# Patient Record
Sex: MALE | Race: WHITE | NOT HISPANIC OR LATINO
[De-identification: names, ages, dates, MRNs, and addresses within clinical notes are randomized per-mention and may not be internally consistent; named-entity substitution may affect disease eponyms.]

---

## 2017-11-21 ENCOUNTER — RESULT REVIEW (OUTPATIENT)
Age: 62
End: 2017-11-21

## 2018-04-17 ENCOUNTER — OUTPATIENT (OUTPATIENT)
Dept: OUTPATIENT SERVICES | Facility: HOSPITAL | Age: 63
LOS: 1 days | Discharge: HOME | End: 2018-04-17

## 2018-04-17 DIAGNOSIS — Z95.5 PRESENCE OF CORONARY ANGIOPLASTY IMPLANT AND GRAFT: Chronic | ICD-10-CM

## 2018-04-17 DIAGNOSIS — C91.40 HAIRY CELL LEUKEMIA NOT HAVING ACHIEVED REMISSION: ICD-10-CM

## 2018-04-23 ENCOUNTER — RESULT REVIEW (OUTPATIENT)
Age: 63
End: 2018-04-23

## 2018-04-23 ENCOUNTER — APPOINTMENT (OUTPATIENT)
Dept: HEMATOLOGY ONCOLOGY | Facility: CLINIC | Age: 63
End: 2018-04-23
Payer: COMMERCIAL

## 2018-04-23 ENCOUNTER — APPOINTMENT (OUTPATIENT)
Dept: HEMATOLOGY ONCOLOGY | Facility: CLINIC | Age: 63
End: 2018-04-23

## 2018-04-23 LAB
ALBUMIN SERPL ELPH-MCNC: 4.3 G/DL
ALP BLD-CCNC: 96 U/L
ALT SERPL-CCNC: 65 U/L
ANION GAP SERPL CALC-SCNC: 13 MMOL/L
AST SERPL-CCNC: 27 U/L
BASOPHILS # BLD AUTO: 0 K/UL — SIGNIFICANT CHANGE UP (ref 0–0.2)
BASOPHILS NFR BLD AUTO: 0.4 % — SIGNIFICANT CHANGE UP (ref 0–2)
BILIRUB SERPL-MCNC: 1.6 MG/DL
BUN SERPL-MCNC: 19 MG/DL
CALCIUM SERPL-MCNC: 9.4 MG/DL
CHLORIDE SERPL-SCNC: 104 MMOL/L
CO2 SERPL-SCNC: 26 MMOL/L
CREAT SERPL-MCNC: 0.95 MG/DL
EOSINOPHIL # BLD AUTO: 0.1 K/UL — SIGNIFICANT CHANGE UP (ref 0–0.5)
EOSINOPHIL NFR BLD AUTO: 2.6 % — SIGNIFICANT CHANGE UP (ref 0–6)
GLUCOSE SERPL-MCNC: 110 MG/DL
HCT VFR BLD CALC: 48.4 % — SIGNIFICANT CHANGE UP (ref 39–50)
HGB BLD-MCNC: 17.3 G/DL — HIGH (ref 13–17)
LDH SERPL-CCNC: 159 U/L
LYMPHOCYTES # BLD AUTO: 1.8 K/UL — SIGNIFICANT CHANGE UP (ref 1–3.3)
LYMPHOCYTES # BLD AUTO: 41.8 % — SIGNIFICANT CHANGE UP (ref 13–44)
MCHC RBC-ENTMCNC: 32 PG — SIGNIFICANT CHANGE UP (ref 27–34)
MCHC RBC-ENTMCNC: 35.6 G/DL — SIGNIFICANT CHANGE UP (ref 32–36)
MCV RBC AUTO: 89.8 FL — SIGNIFICANT CHANGE UP (ref 80–100)
MONOCYTES # BLD AUTO: 0.2 K/UL — SIGNIFICANT CHANGE UP (ref 0–0.9)
MONOCYTES NFR BLD AUTO: 5.2 % — SIGNIFICANT CHANGE UP (ref 2–14)
NEUTROPHILS # BLD AUTO: 2.2 K/UL — SIGNIFICANT CHANGE UP (ref 1.8–7.4)
NEUTROPHILS NFR BLD AUTO: 50.1 % — SIGNIFICANT CHANGE UP (ref 43–77)
PLATELET # BLD AUTO: 163 K/UL — SIGNIFICANT CHANGE UP (ref 150–400)
POTASSIUM SERPL-SCNC: 4.5 MMOL/L
PROT SERPL-MCNC: 6.8 G/DL
PSA SERPL-MCNC: 1.13 NG/ML
RBC # BLD: 5.39 M/UL — SIGNIFICANT CHANGE UP (ref 4.2–5.8)
RBC # FLD: 12.1 % — SIGNIFICANT CHANGE UP (ref 10.3–14.5)
SODIUM SERPL-SCNC: 143 MMOL/L
WBC # BLD: 4.4 K/UL — SIGNIFICANT CHANGE UP (ref 3.8–10.5)
WBC # FLD AUTO: 4.4 K/UL — SIGNIFICANT CHANGE UP (ref 3.8–10.5)

## 2018-04-23 PROCEDURE — 99215 OFFICE O/P EST HI 40 MIN: CPT

## 2018-05-03 ENCOUNTER — EMERGENCY (EMERGENCY)
Facility: HOSPITAL | Age: 63
LOS: 1 days | Discharge: HOME | End: 2018-05-03
Attending: EMERGENCY MEDICINE | Admitting: EMERGENCY MEDICINE
Payer: COMMERCIAL

## 2018-05-03 VITALS
RESPIRATION RATE: 18 BRPM | DIASTOLIC BLOOD PRESSURE: 84 MMHG | OXYGEN SATURATION: 96 % | SYSTOLIC BLOOD PRESSURE: 143 MMHG | HEART RATE: 93 BPM | TEMPERATURE: 98 F

## 2018-05-03 DIAGNOSIS — Z95.5 PRESENCE OF CORONARY ANGIOPLASTY IMPLANT AND GRAFT: Chronic | ICD-10-CM

## 2018-05-03 PROCEDURE — 99283 EMERGENCY DEPT VISIT LOW MDM: CPT

## 2018-05-03 RX ORDER — METOPROLOL TARTRATE 50 MG
50 TABLET ORAL ONCE
Qty: 0 | Refills: 0 | Status: COMPLETED | OUTPATIENT
Start: 2018-05-03 | End: 2018-05-03

## 2018-05-03 RX ORDER — FAMOTIDINE 10 MG/ML
40 INJECTION INTRAVENOUS ONCE
Qty: 0 | Refills: 0 | Status: COMPLETED | OUTPATIENT
Start: 2018-05-03 | End: 2018-05-03

## 2018-05-03 RX ORDER — EPINEPHRINE 0.3 MG/.3ML
0.3 INJECTION INTRAMUSCULAR; SUBCUTANEOUS ONCE
Qty: 0 | Refills: 0 | Status: COMPLETED | OUTPATIENT
Start: 2018-05-03 | End: 2018-05-03

## 2018-05-03 RX ORDER — ACETAMINOPHEN 500 MG
975 TABLET ORAL ONCE
Qty: 0 | Refills: 0 | Status: COMPLETED | OUTPATIENT
Start: 2018-05-03 | End: 2018-05-03

## 2018-05-03 RX ORDER — ONDANSETRON 8 MG/1
4 TABLET, FILM COATED ORAL ONCE
Qty: 0 | Refills: 0 | Status: COMPLETED | OUTPATIENT
Start: 2018-05-03 | End: 2018-05-03

## 2018-05-03 NOTE — ED ADULT TRIAGE NOTE - CHIEF COMPLAINT QUOTE
Pt ate sesame crusted tuna, had abdominal pain with nausea within an hour after, then noticed rashes develop on face, chest, back, abdomen, upper and lower extremities, took Benadryl at approx. 8PM with no relief, denies throat tightening, SOB, tongue, swelling, itching. Speaking in complete sentences, no drooling.

## 2018-05-03 NOTE — ED PROVIDER NOTE - PMH
CAD in native artery    Gilbert's syndrome    Hairy cell leukemia  S/P Chemo  History of appendectomy    History of supraventricular tachycardia    HTN (hypertension)    Ulnar fracture  S/P ORIF

## 2018-05-03 NOTE — ED PROVIDER NOTE - ATTENDING CONTRIBUTION TO CARE
I, Jennifer Cabot, MD, have performed a history and physical exam of the patient and discussed their management with the resident. I reviewed the resident's note and agree with the documented findings and plan of care. My medical decision making and observations are found above.    Cabot: 62M coming in with skin erythema, hives, diffuse abd pain, and diarrhea after eating tuna.  No dyspnea, stridor, mouth or throat swelling.  Sx nearly resolved after benadryl at 8pm.  On exam, oropharynx is normal, lungs CTAB, mild erythema to abdomen only, no hives, abd NTTP.  Scombroid vs allergic reaction.  Will monitor until 2am and if no rebound, will discharge home with short course of steroids, benadryl, H2B, and a rescue epipen.

## 2018-05-03 NOTE — ED PROVIDER NOTE - MEDICAL DECISION MAKING DETAILS
blanching diffuse skin erythema, nausea, abd pain, diarrhea after tuna ingestion->histamine/scombroid poisoning vs. anaphylaxis vs. food poisoning->steroids, antihistamines, zofran, tylenol, observe until 2am. blanching diffuse skin erythema, nausea, abd pain, diarrhea after tuna ingestion->histamine/scombroid poisoning vs. anaphylaxis vs. food poisoning->steroids, antihistamines, zofran, tylenol, observe until 2am.    Cabot: 62M coming in with skin erythema, hives, diffuse abd pain, and diarrhea after eating tuna.  No dyspnea, stridor, mouth or throat swelling.  Sx nearly resolved after benadryl at 8pm.  On exam, oropharynx is normal, lungs CTAB, mild erythema to abdomen only, no hives, abd NTTP.  Scombroid vs allergic reaction.  Will monitor until 2am and if no rebound, will discharge home with short course of steroids, benadryl, H2B, and a rescue epipen.

## 2018-05-03 NOTE — ED PROVIDER NOTE - PROGRESS NOTE DETAILS
AJM: pt feeling improved. meds already sent to pharmacy by dr cabot. stable for [facility] KIEL BEARD: patient signed out to me by resident Praveen to monitor patient until 2am and discharge with Epi pen to go home with. Pt symptom improved, no complaints of sob, or any other complaints. Pt can dispo home to f/u with PCP.

## 2018-05-03 NOTE — ED PROVIDER NOTE - OBJECTIVE STATEMENT
62yM hx htn, hld, CAD s/p stent (on asa and plavix) p/w diffuse body erythema, nausea, diarrhea x 5 episodes, abdominal pain. Pt ate a pepper/sesame seed crusted grilled tuna for dinner at 630pm. He was the only person who ate this tuna. Symptoms began at 730pm. Pt took 50mg po benadryl at 830pm and noticed improvement in erythema at ~10pm. Denies difficulty breathing or swallowing, facial swelling, fever, sob, cp, or other symptoms.

## 2018-05-03 NOTE — ED PROVIDER NOTE - SKIN RASH DESCRIPTION
back, chest, face, neck diffuse, patchy erythema to bl upper and lower extremities/BLANCHING/REDDENED

## 2018-05-04 VITALS
SYSTOLIC BLOOD PRESSURE: 117 MMHG | HEART RATE: 85 BPM | DIASTOLIC BLOOD PRESSURE: 74 MMHG | OXYGEN SATURATION: 99 % | RESPIRATION RATE: 16 BRPM

## 2018-05-04 RX ORDER — FAMOTIDINE 10 MG/ML
1 INJECTION INTRAVENOUS
Qty: 4 | Refills: 0 | OUTPATIENT
Start: 2018-05-04 | End: 2018-05-05

## 2018-05-04 RX ORDER — DIPHENHYDRAMINE HCL 50 MG
1 CAPSULE ORAL
Qty: 8 | Refills: 0 | OUTPATIENT
Start: 2018-05-04 | End: 2018-05-05

## 2018-05-04 RX ORDER — ONDANSETRON 8 MG/1
1 TABLET, FILM COATED ORAL
Qty: 6 | Refills: 0 | OUTPATIENT
Start: 2018-05-04 | End: 2018-05-05

## 2018-05-04 RX ORDER — EPINEPHRINE 0.3 MG/.3ML
0.3 INJECTION INTRAMUSCULAR; SUBCUTANEOUS
Qty: 1 | Refills: 0 | OUTPATIENT
Start: 2018-05-04

## 2018-05-04 RX ADMIN — EPINEPHRINE 0.3 MILLIGRAM(S): 0.3 INJECTION INTRAMUSCULAR; SUBCUTANEOUS at 00:16

## 2018-05-04 RX ADMIN — Medication 975 MILLIGRAM(S): at 00:12

## 2018-05-04 RX ADMIN — Medication 60 MILLIGRAM(S): at 00:11

## 2018-05-04 RX ADMIN — FAMOTIDINE 40 MILLIGRAM(S): 10 INJECTION INTRAVENOUS at 00:11

## 2018-05-04 RX ADMIN — ONDANSETRON 4 MILLIGRAM(S): 8 TABLET, FILM COATED ORAL at 00:11

## 2018-05-04 RX ADMIN — Medication 50 MILLIGRAM(S): at 00:12

## 2018-05-04 NOTE — ED ADULT NURSE NOTE - OBJECTIVE STATEMENT
62y M AaOx3 c/o Pt ate sesame crusted tuna, had abdominal pain with nausea within an hour after, then noticed rashes develop on face, chest, back, abdomen, upper and lower extremities, took Benadryl at approx. 8PM with no relief. pt denies sob/cp/palpitations at this time. pt presents in NAD and noted with no tongue or lip swelling. pt medicated as per md order.

## 2018-05-04 NOTE — ED ADULT NURSE REASSESSMENT NOTE - NS ED NURSE REASSESS COMMENT FT1
pt resting comfortable and drinking water. pt states he is having no difficulty breathing, respirations are even and unlabored. pt shows no signs of additional swelling/redness.

## 2018-07-02 ENCOUNTER — OUTPATIENT (OUTPATIENT)
Dept: EMERGENCY DEPT | Facility: HOSPITAL | Age: 63
LOS: 1 days | Discharge: HOME | End: 2018-07-02
Payer: COMMERCIAL

## 2018-07-02 ENCOUNTER — TRANSCRIPTION ENCOUNTER (OUTPATIENT)
Age: 63
End: 2018-07-02

## 2018-07-02 VITALS
TEMPERATURE: 98 F | OXYGEN SATURATION: 98 % | DIASTOLIC BLOOD PRESSURE: 82 MMHG | SYSTOLIC BLOOD PRESSURE: 153 MMHG | HEART RATE: 67 BPM | RESPIRATION RATE: 18 BRPM

## 2018-07-02 VITALS
HEART RATE: 66 BPM | OXYGEN SATURATION: 97 % | DIASTOLIC BLOOD PRESSURE: 68 MMHG | RESPIRATION RATE: 18 BRPM | SYSTOLIC BLOOD PRESSURE: 143 MMHG

## 2018-07-02 DIAGNOSIS — R07.9 CHEST PAIN, UNSPECIFIED: ICD-10-CM

## 2018-07-02 DIAGNOSIS — Z95.5 PRESENCE OF CORONARY ANGIOPLASTY IMPLANT AND GRAFT: Chronic | ICD-10-CM

## 2018-07-02 LAB
ALBUMIN SERPL ELPH-MCNC: 4.3 G/DL — SIGNIFICANT CHANGE UP (ref 3.3–5)
ALP SERPL-CCNC: 104 U/L — SIGNIFICANT CHANGE UP (ref 40–120)
ALT FLD-CCNC: 60 U/L — HIGH (ref 4–41)
APTT BLD: 29.5 SEC — SIGNIFICANT CHANGE UP (ref 27.5–37.4)
AST SERPL-CCNC: 24 U/L — SIGNIFICANT CHANGE UP (ref 4–40)
BILIRUB SERPL-MCNC: 1.9 MG/DL — HIGH (ref 0.2–1.2)
BUN SERPL-MCNC: 19 MG/DL — SIGNIFICANT CHANGE UP (ref 7–23)
CALCIUM SERPL-MCNC: 8.9 MG/DL — SIGNIFICANT CHANGE UP (ref 8.4–10.5)
CHLORIDE SERPL-SCNC: 103 MMOL/L — SIGNIFICANT CHANGE UP (ref 98–107)
CO2 SERPL-SCNC: 27 MMOL/L — SIGNIFICANT CHANGE UP (ref 22–31)
CREAT SERPL-MCNC: 0.84 MG/DL — SIGNIFICANT CHANGE UP (ref 0.5–1.3)
GLUCOSE SERPL-MCNC: 115 MG/DL — HIGH (ref 70–99)
HCT VFR BLD CALC: 45.3 % — SIGNIFICANT CHANGE UP (ref 39–50)
HGB BLD-MCNC: 16.5 G/DL — SIGNIFICANT CHANGE UP (ref 13–17)
INR BLD: 0.91 — SIGNIFICANT CHANGE UP (ref 0.88–1.17)
MCHC RBC-ENTMCNC: 31.1 PG — SIGNIFICANT CHANGE UP (ref 27–34)
MCHC RBC-ENTMCNC: 36.4 % — HIGH (ref 32–36)
MCV RBC AUTO: 85.5 FL — SIGNIFICANT CHANGE UP (ref 80–100)
NRBC # FLD: 0 — SIGNIFICANT CHANGE UP
PLATELET # BLD AUTO: 172 K/UL — SIGNIFICANT CHANGE UP (ref 150–400)
PMV BLD: 9 FL — SIGNIFICANT CHANGE UP (ref 7–13)
POTASSIUM SERPL-MCNC: 4.2 MMOL/L — SIGNIFICANT CHANGE UP (ref 3.5–5.3)
POTASSIUM SERPL-SCNC: 4.2 MMOL/L — SIGNIFICANT CHANGE UP (ref 3.5–5.3)
PROT SERPL-MCNC: 6.9 G/DL — SIGNIFICANT CHANGE UP (ref 6–8.3)
PROTHROM AB SERPL-ACNC: 10.4 SEC — SIGNIFICANT CHANGE UP (ref 9.8–13.1)
RBC # BLD: 5.3 M/UL — SIGNIFICANT CHANGE UP (ref 4.2–5.8)
RBC # FLD: 12.5 % — SIGNIFICANT CHANGE UP (ref 10.3–14.5)
SODIUM SERPL-SCNC: 141 MMOL/L — SIGNIFICANT CHANGE UP (ref 135–145)
TROPONIN T, HIGH SENSITIVITY: 6 NG/L — SIGNIFICANT CHANGE UP (ref ?–14)
WBC # BLD: 4.65 K/UL — SIGNIFICANT CHANGE UP (ref 3.8–10.5)
WBC # FLD AUTO: 4.65 K/UL — SIGNIFICANT CHANGE UP (ref 3.8–10.5)

## 2018-07-02 PROCEDURE — 93010 ELECTROCARDIOGRAM REPORT: CPT

## 2018-07-02 RX ORDER — DIPHENHYDRAMINE HYDROCHLORIDE AND LIDOCAINE HYDROCHLORIDE AND ALUMINUM HYDROXIDE AND MAGNESIUM HYDRO
15 KIT ONCE
Qty: 0 | Refills: 0 | Status: DISCONTINUED | OUTPATIENT
Start: 2018-07-02 | End: 2018-07-02

## 2018-07-02 RX ORDER — SODIUM CHLORIDE 9 MG/ML
500 INJECTION INTRAMUSCULAR; INTRAVENOUS; SUBCUTANEOUS
Qty: 0 | Refills: 0 | Status: DISCONTINUED | OUTPATIENT
Start: 2018-07-02 | End: 2018-07-02

## 2018-07-02 RX ORDER — ASPIRIN/CALCIUM CARB/MAGNESIUM 324 MG
243 TABLET ORAL ONCE
Qty: 0 | Refills: 0 | Status: COMPLETED | OUTPATIENT
Start: 2018-07-02 | End: 2018-07-02

## 2018-07-02 RX ORDER — HEPARIN SODIUM 5000 [USP'U]/ML
5000 INJECTION INTRAVENOUS; SUBCUTANEOUS EVERY 12 HOURS
Qty: 0 | Refills: 0 | Status: DISCONTINUED | OUTPATIENT
Start: 2018-07-03 | End: 2018-07-02

## 2018-07-02 RX ORDER — NITROGLYCERIN 6.5 MG
0.4 CAPSULE, EXTENDED RELEASE ORAL ONCE
Qty: 0 | Refills: 0 | Status: COMPLETED | OUTPATIENT
Start: 2018-07-02 | End: 2018-07-02

## 2018-07-02 RX ORDER — SODIUM CHLORIDE 9 MG/ML
3 INJECTION INTRAMUSCULAR; INTRAVENOUS; SUBCUTANEOUS EVERY 8 HOURS
Qty: 0 | Refills: 0 | Status: DISCONTINUED | OUTPATIENT
Start: 2018-07-02 | End: 2018-07-02

## 2018-07-02 RX ORDER — ACETAMINOPHEN 500 MG
650 TABLET ORAL EVERY 6 HOURS
Qty: 0 | Refills: 0 | Status: DISCONTINUED | OUTPATIENT
Start: 2018-07-02 | End: 2018-07-02

## 2018-07-02 RX ORDER — CLOPIDOGREL BISULFATE 75 MG/1
75 TABLET, FILM COATED ORAL DAILY
Qty: 0 | Refills: 0 | Status: DISCONTINUED | OUTPATIENT
Start: 2018-07-02 | End: 2018-07-02

## 2018-07-02 RX ADMIN — CLOPIDOGREL BISULFATE 75 MILLIGRAM(S): 75 TABLET, FILM COATED ORAL at 12:01

## 2018-07-02 RX ADMIN — Medication 0.4 MILLIGRAM(S): at 10:40

## 2018-07-02 RX ADMIN — Medication 243 MILLIGRAM(S): at 12:02

## 2018-07-02 NOTE — DISCHARGE NOTE ADULT - CARE PLAN
Principal Discharge DX:	Chest pain, unspecified type  Goal:	Resolution of symptoms  Assessment and plan of treatment:	Follow up with your Cardiologist or Primary care doctor in 1-2 weeks  Call doctor if symptoms return and if unreachable return to Emergency Department  Secondary Diagnosis:	CAD in native artery  Assessment and plan of treatment:	Continue home medications

## 2018-07-02 NOTE — ED ADULT NURSE REASSESSMENT NOTE - NS ED NURSE REASSESS COMMENT FT1
Patient awake and alert, states feeling chest pressure and light headed this morning while climbing stairs , felt like he was going to pass out. NSR on cardiac monitor, IV placed, labs sent. Will continue to monitor. Patient awake and alert, states feeling chest pressure and light headed this morning while climbing stairs , felt like he was going to pass out. NSR on cardiac monitor, IV placed, labs sent. Will continue to monitor. Patient states he still feels chest pressure, pain 6/10.

## 2018-07-02 NOTE — DISCHARGE NOTE ADULT - PLAN OF CARE
Resolution of symptoms Follow up with your Cardiologist or Primary care doctor in 1-2 weeks  Call doctor if symptoms return and if unreachable return to Emergency Department Continue home medications

## 2018-07-02 NOTE — ED PROVIDER NOTE - ATTENDING CONTRIBUTION TO CARE
DR. BLOCH, ATTENDING MD-  I performed a face to face bedside interview with patient regarding history of present illness, review of symptoms and past medical history. I completed an independent physical exam.  I have discussed patient's plan of care with the resident.   exertional 6/10 pressure pain with SOB, no n/diaphoresis, similar to cp with stent, stopped plavix yesterday- had 1 episode 2 days ago. Well appearing, Anxious, HEENT nl lungs clear, heart sounds nml ext no edema, pulses present, no neurodeficits. DR. BLOCH, ATTENDING MD-  I performed a face to face bedside interview with patient regarding history of present illness, review of symptoms and past medical history. I completed an independent physical exam.  I have discussed patient's plan of care with the resident.   exertional 6/10 pressure pain with SOB, no n/diaphoresis, similar to cp with stent, stopped plavix yesterday- had 1 episode 2 days ago. Well appearing, Anxious, HEENT nl lungs clear, heart sounds nml ext no edema, pulses present, no neuro deficits.

## 2018-07-02 NOTE — H&P CARDIOLOGY - HISTORY OF PRESENT ILLNESS
62 y/o M w/ PMH of CAD s/p stent(2012), Hairy cell leukemia(in remission), HTN and HLD presents for cardiac catheretization. Pt admits to having some chest pain over the weekend which resolved but today when he was walking up the stairs around 0830 AM he started having mid-sternal chest pressure, shortness of breath and lightheadedness. Pt had a angiogram in 2015 which showed his stent was patent but 2/2 his worrisome symptoms today he will have repeat angiogram to r/o worsening CAD. Pt denies N/V/D, fevers, chills, cough, palpitations, syncope, orthopnea, nocturnal paroxysmal dyspnea, edema, cyanosis, heart murmurs, varicosities, phlebitis, claudication.

## 2018-07-02 NOTE — H&P CARDIOLOGY - NEGATIVE NEUROLOGICAL SYMPTOMS
no vertigo/no loss of sensation/no difficulty walking/no confusion/no transient paralysis/no headache/no weakness/no paresthesias/no generalized seizures/no tremors/no loss of consciousness/no hemiparesis/no focal seizures/no syncope/no facial palsy

## 2018-07-02 NOTE — ED ADULT TRIAGE NOTE - CHIEF COMPLAINT QUOTE
p/t with hx cardiac stents c/o of chest discomfort with mild sob since this am, p/t appears uncomfortable

## 2018-07-02 NOTE — ED ADULT NURSE REASSESSMENT NOTE - NS ED NURSE REASSESS COMMENT FT1
Patient awake and alert, report given to cath lab RN, medicated as ordered with asa and Plavix. Patient continues to have chest pressure.

## 2018-07-02 NOTE — DISCHARGE NOTE ADULT - CARE PROVIDER_API CALL
Nilesh Montana), Cardiovascular Disease; Internal Medicine; Interventional Cardiology  [address] [address]  Phone: [phone]  Fax: [phone]

## 2018-07-02 NOTE — H&P CARDIOLOGY - ATTENDING COMMENTS
patient seen in the ER  Patient has a history of prior stent to the mid LADin 2013, subsequent catheterization 2015 patent stents  Now presents  with several episodes of exertional chest pressure and shortness of breath similar to what he had prior to the stent  EKG was normal  Physical exam unremarkable    He was brought to the cardiac Cath Lab  Catheterization was performed from the right femoral artery and Angio-Seal was inserted after the procedure  Catheterization revealed normal left ventricular function, no wall motion abnormality, widely patent stent in the LAD and no other significant disease in the left main circumflex or right coronary artery    Patient was reassured that his  coronaries were stable and no intervention was needed  He was discharged home from the catheter lab area after observation with stable right groin  He will follow-up with me routinely  within one month.    Nilesh Montana M.D.

## 2018-07-02 NOTE — H&P CARDIOLOGY - RS GEN PE MLT RESP DETAILS PC
breath sounds equal/good air movement/airway patent/no chest wall tenderness/clear to auscultation bilaterally/respirations non-labored

## 2018-07-02 NOTE — DISCHARGE NOTE ADULT - HOSPITAL COURSE
64 y/o M w/ PMH of CAD s/p stent(2012), Hairy cell leukemia(in remission), HTN and HLD presents for cardiac catheretization. Pt admits to having some chest pain over the weekend which resolved but today when he was walking up the stairs around 0830 AM he started having mid-sternal chest pressure, shortness of breath and lightheadedness. Pt had a angiogram in 2015 which showed his stent was patent but 2/2 his worrisome symptoms today he will have repeat angiogram to r/o worsening CAD. Pt denies N/V/D, fevers, chills, cough, palpitations, syncope, orthopnea, nocturnal paroxysmal dyspnea, edema, cyanosis, heart murmurs, varicosities, phlebitis, claudication.     Pt had angiogram showing patent mid LAD stent and EF of 65%. Pt determined to be stable for discharge after procedure and plan discussed with Dr. Montana

## 2018-07-02 NOTE — ED PROVIDER NOTE - NS ED ROS FT
GENERAL: No fever or chills, EYES: no change in vision, HEENT: no trouble swallowing or speaking, CARDIAC: +chest pressure, PULMONARY: no cough or SOB, GI: no abdominal pain, no nausea, no vomiting, no diarrhea or constipation, : No changes in urination, SKIN: no rashes, NEURO: no headache,  MSK: No joint pain ~Viviana Dee M.D. Resident

## 2018-07-02 NOTE — ED PROVIDER NOTE - PHYSICAL EXAMINATION
Gen: AAOx3, non-toxic  Head: NCAT  HEENT: EOMI, oral mucosa moist, normal conjunctiva  Lung: CTAB, no respiratory distress, no wheezes/rhonchi/rales B/L, speaking in full sentences  CV: RRR, no murmurs, rubs or gallops  Abd: soft, NTND, no guarding  MSK: no visible deformities  Neuro: No focal sensory or motor deficits  Skin: Warm, well perfused, no rash  Psych: normal affect.   ~Viviana Dee M.D. Resident

## 2018-07-02 NOTE — ED PROVIDER NOTE - OBJECTIVE STATEMENT
62 yo M PMhx CAD with stent x 1 in 2012, leukemia in 2003 p/w chest pressure. He had some pain over the weekend which resolved but today when he was walking up the stairs around 0830 AM he started having chest pressure in his central chest and some lightheadedness. He denies N/V, abd pain, fevers/chills, cough. He is on Aspirin and Plavix but stopped his Plavix yesterday for a procedure he will be having in the office on Thursday for some pulmonary nodes(?) with inconclusive biopsy. 64 yo M PMhx CAD with stent x 1 in 2012, leukemia in 2003 p/w chest pressure. He had some pain over the weekend which resolved but today when he was walking up the stairs around 0830 AM he started having chest pressure in his central chest and some lightheadedness. He denies N/V, abd pain, fevers/chills, cough. He is on Aspirin and Plavix but stopped his Plavix yesterday for a procedure he will be having in the outpatient office on Thursday for some pulmonary nodes(?) with inconclusive biopsy. He follows with card 64 yo M PMhx CAD with stent x 1 in 2012, leukemia in 2003 p/w chest pressure. He had some pain over the weekend which resolved but today when he was walking up the stairs around 0830 AM he started having chest pressure in his central chest and some lightheadedness. He denies N/V, abd pain, fevers/chills, cough. He is on Aspirin and Plavix but stopped his Plavix yesterday for a procedure he will be having in the outpatient office on Thursday for some pulmonary nodes(?) with inconclusive biopsy. He follows with cardiology (Dr. Montana) at [facility]. He had a stress and ECHO in September. 64 yo M PMhx CAD with stent x 1 in 2012, leukemia in 2003 p/w chest pressure. He had some pain over the weekend which resolved but today when he was walking up the stairs around 0830 AM he started having chest pressure in his central chest and some lightheadedness. He denies N/V, abd pain, fevers/chills, cough. He is on Aspirin and Plavix but stopped his Plavix yesterday for a procedure he will be having in the outpatient office on Thursday for some skin nodules(?) with inconclusive biopsy. He follows with cardiology (Dr. Montana) at [facility]. He had a stress and ECHO in September.

## 2018-07-02 NOTE — DISCHARGE NOTE ADULT - MEDICATION SUMMARY - MEDICATIONS TO TAKE
I will START or STAY ON the medications listed below when I get home from the hospital:    Aspir 81 oral delayed release tablet  -- 1 tab(s) by mouth once a day  -- Indication: For CAD    Lipitor 20 mg oral tablet  -- 1 tab(s) by mouth once a day (at bedtime)  -- Indication: For HLD    Plavix 75 mg oral tablet  -- 1 tab(s) by mouth once a day  -- Indication: For CAD    Toprol-XL 50 mg oral tablet, extended release  -- 1 tab(s) by mouth 2 times a day  -- Indication: For HTN

## 2018-07-02 NOTE — DISCHARGE NOTE ADULT - PATIENT PORTAL LINK FT
You can access the [facility] Patient Portal, offered by [facility], by registering with the following website: [email or web address]

## 2018-07-02 NOTE — ED PROVIDER NOTE - MEDICAL DECISION MAKING DETAILS
64 yo M PMhx CAD with stent x 1 in 2012, leukemia in 2003 p/w exertional chest pressure with significant cardiac history, DDx: ACS, angina, will obtain basic labs + trop, EKG, nitro, call cardiologist, CDU vs admit

## 2018-07-11 RX ORDER — METOPROLOL TARTRATE 50 MG
1 TABLET ORAL
Qty: 0 | Refills: 0 | COMMUNITY

## 2018-07-11 RX ORDER — METOPROLOL TARTRATE 50 MG
2 TABLET ORAL
Qty: 0 | Refills: 0 | COMMUNITY

## 2018-07-20 ENCOUNTER — APPOINTMENT (OUTPATIENT)
Dept: GASTROENTEROLOGY | Facility: CLINIC | Age: 63
End: 2018-07-20
Payer: COMMERCIAL

## 2018-07-20 VITALS
DIASTOLIC BLOOD PRESSURE: 80 MMHG | SYSTOLIC BLOOD PRESSURE: 120 MMHG | TEMPERATURE: 98.6 F | BODY MASS INDEX: 30.92 KG/M2 | HEIGHT: 68 IN | OXYGEN SATURATION: 99 % | WEIGHT: 204 LBS | HEART RATE: 68 BPM

## 2018-07-20 DIAGNOSIS — Z83.518 FAMILY HISTORY OF OTHER SPECIFIED EYE DISORDER: ICD-10-CM

## 2018-07-20 DIAGNOSIS — Z12.11 ENCOUNTER FOR SCREENING FOR MALIGNANT NEOPLASM OF COLON: ICD-10-CM

## 2018-07-20 PROCEDURE — 99244 OFF/OP CNSLTJ NEW/EST MOD 40: CPT

## 2018-12-05 ENCOUNTER — APPOINTMENT (OUTPATIENT)
Dept: GASTROENTEROLOGY | Facility: AMBULATORY MEDICAL SERVICES | Age: 63
End: 2018-12-05
Payer: COMMERCIAL

## 2018-12-05 PROCEDURE — 45385 COLONOSCOPY W/LESION REMOVAL: CPT | Mod: 33

## 2019-02-21 ENCOUNTER — APPOINTMENT (OUTPATIENT)
Dept: PULMONOLOGY | Facility: CLINIC | Age: 64
End: 2019-02-21
Payer: MEDICARE

## 2019-02-21 ENCOUNTER — NON-APPOINTMENT (OUTPATIENT)
Age: 64
End: 2019-02-21

## 2019-02-21 VITALS
SYSTOLIC BLOOD PRESSURE: 125 MMHG | OXYGEN SATURATION: 98 % | RESPIRATION RATE: 16 BRPM | DIASTOLIC BLOOD PRESSURE: 76 MMHG | HEART RATE: 53 BPM

## 2019-02-21 LAB
ALBUMIN: 10
BILIRUB UR QL STRIP: NEGATIVE
CLARITY UR: CLEAR
CREATININE: 300
GLUCOSE UR-MCNC: NEGATIVE
HCG UR QL: 0.2 EU/DL
HGB UR QL STRIP.AUTO: NORMAL
KETONES UR-MCNC: NEGATIVE
LEUKOCYTE ESTERASE UR QL STRIP: NEGATIVE
MICROALBUMIN/CREAT UR TEST STR-RTO: <30
NITRITE UR QL STRIP: NEGATIVE
PH UR STRIP: 5
PROT UR STRIP-MCNC: NEGATIVE
SP GR UR STRIP: 1.02

## 2019-02-21 PROCEDURE — 81003 URINALYSIS AUTO W/O SCOPE: CPT | Mod: QW

## 2019-02-21 PROCEDURE — 81002 URINALYSIS NONAUTO W/O SCOPE: CPT | Mod: 59

## 2019-02-21 PROCEDURE — 93000 ELECTROCARDIOGRAM COMPLETE: CPT

## 2019-02-21 PROCEDURE — 82044 UR ALBUMIN SEMIQUANTITATIVE: CPT | Mod: QW

## 2019-02-21 PROCEDURE — 36415 COLL VENOUS BLD VENIPUNCTURE: CPT

## 2019-02-21 PROCEDURE — 71046 X-RAY EXAM CHEST 2 VIEWS: CPT

## 2019-02-21 PROCEDURE — 82570 ASSAY OF URINE CREATININE: CPT | Mod: QW

## 2019-02-21 PROCEDURE — 99396 PREV VISIT EST AGE 40-64: CPT | Mod: 25

## 2019-02-21 NOTE — PHYSICAL EXAM
[General Appearance - Well Developed] : well developed [Normal Appearance] : normal appearance [Well Groomed] : well groomed [General Appearance - Well Nourished] : well nourished [No Deformities] : no deformities [General Appearance - In No Acute Distress] : no acute distress [Normal Conjunctiva] : the conjunctiva exhibited no abnormalities [Eyelids - No Xanthelasma] : the eyelids demonstrated no xanthelasmas [Normal Oropharynx] : normal oropharynx [I] : I [Neck Appearance] : the appearance of the neck was normal [Neck Cervical Mass (___cm)] : no neck mass was observed [Jugular Venous Distention Increased] : there was no jugular-venous distention [Thyroid Diffuse Enlargement] : the thyroid was not enlarged [Heart Rate And Rhythm] : heart rate and rhythm were normal [Heart Sounds] : normal S1 and S2 [Murmurs] : no murmurs present [Arterial Pulses Normal] : the arterial pulses were normal [Edema] : no peripheral edema present [Veins - Varicosity Changes] : no varicosital changes were noted in the lower extremities [Respiration, Rhythm And Depth] : normal respiratory rhythm and effort [Exaggerated Use Of Accessory Muscles For Inspiration] : no accessory muscle use [Auscultation Breath Sounds / Voice Sounds] : lungs were clear to auscultation bilaterally [Chest Palpation] : palpation of the chest revealed no abnormalities [Lungs Percussion] : the lungs were normal to percussion [Bowel Sounds] : normal bowel sounds [Abdomen Soft] : soft [Abdomen Tenderness] : non-tender [Abdomen Mass (___ Cm)] : no abdominal mass palpated [Abnormal Walk] : normal gait [Gait - Sufficient For Exercise Testing] : the gait was sufficient for exercise testing [Nail Clubbing] : no clubbing of the fingernails [Cyanosis, Localized] : no localized cyanosis [Petechial Hemorrhages (___cm)] : no petechial hemorrhages [Skin Color & Pigmentation] : normal skin color and pigmentation [] : no rash [No Venous Stasis] : no venous stasis [Skin Lesions] : no skin lesions [No Skin Ulcers] : no skin ulcer [No Xanthoma] : no  xanthoma was observed [Deep Tendon Reflexes (DTR)] : deep tendon reflexes were 2+ and symmetric [Sensation] : the sensory exam was normal to light touch and pinprick [Motor Exam] : the motor exam was normal [No Focal Deficits] : no focal deficits [Oriented To Time, Place, And Person] : oriented to person, place, and time [Impaired Insight] : insight and judgment were intact [Affect] : the affect was normal [Mood] : the mood was normal

## 2019-02-22 ENCOUNTER — TRANSCRIPTION ENCOUNTER (OUTPATIENT)
Age: 64
End: 2019-02-22

## 2019-02-22 LAB
25(OH)D3 SERPL-MCNC: 38.6 NG/ML
ALBUMIN SERPL ELPH-MCNC: 4.6 G/DL
ALP BLD-CCNC: 89 U/L
ALT SERPL-CCNC: 56 U/L
ANION GAP SERPL CALC-SCNC: 12 MMOL/L
AST SERPL-CCNC: 25 U/L
BASOPHILS # BLD AUTO: 0.01 K/UL
BASOPHILS NFR BLD AUTO: 0.3 %
BILIRUB SERPL-MCNC: 2.1 MG/DL
BUN SERPL-MCNC: 21 MG/DL
CALCIUM SERPL-MCNC: 9.5 MG/DL
CHLORIDE SERPL-SCNC: 106 MMOL/L
CHOLEST SERPL-MCNC: 132 MG/DL
CHOLEST/HDLC SERPL: 2.7 RATIO
CO2 SERPL-SCNC: 26 MMOL/L
CREAT SERPL-MCNC: 0.86 MG/DL
EOSINOPHIL # BLD AUTO: 0.06 K/UL
EOSINOPHIL NFR BLD AUTO: 1.6 %
GLUCOSE SERPL-MCNC: 102 MG/DL
HBA1C MFR BLD HPLC: 5.4 %
HCT VFR BLD CALC: 49 %
HCV AB SER QL: NONREACTIVE
HCV S/CO RATIO: 0.09 S/CO
HDLC SERPL-MCNC: 49 MG/DL
HGB BLD-MCNC: 16.6 G/DL
IMM GRANULOCYTES NFR BLD AUTO: 0 %
LDLC SERPL CALC-MCNC: 67 MG/DL
LYMPHOCYTES # BLD AUTO: 1.46 K/UL
LYMPHOCYTES NFR BLD AUTO: 38.9 %
MAN DIFF?: NORMAL
MCHC RBC-ENTMCNC: 31.6 PG
MCHC RBC-ENTMCNC: 33.9 GM/DL
MCV RBC AUTO: 93.3 FL
MONOCYTES # BLD AUTO: 0.12 K/UL
MONOCYTES NFR BLD AUTO: 3.2 %
NEUTROPHILS # BLD AUTO: 2.1 K/UL
NEUTROPHILS NFR BLD AUTO: 56 %
PLATELET # BLD AUTO: 183 K/UL
POTASSIUM SERPL-SCNC: 4.5 MMOL/L
PROT SERPL-MCNC: 6.8 G/DL
PSA SERPL-MCNC: 0.91 NG/ML
RBC # BLD: 5.25 M/UL
RBC # FLD: 13 %
SODIUM SERPL-SCNC: 144 MMOL/L
T4 FREE SERPL-MCNC: 1.3 NG/DL
T4 SERPL-MCNC: 6.7 UG/DL
TRIGL SERPL-MCNC: 79 MG/DL
TSH SERPL-ACNC: 1.34 UIU/ML
WBC # FLD AUTO: 3.75 K/UL

## 2019-02-22 NOTE — PROCEDURE
[FreeTextEntry1] : Blood Draw\par Urine negative\par EKG 2/21/2019\par sinus bradycardia\par CXR PA lateral 2/21/2019\par stool cards\par  Colonoscopy up to date\par Chest x-ray PA lateral projection February 21, 2019\par Cardiac size normal\par Right central mid lung zone demonstrate some peribronchial cuffing\par No pleural effusions parenchymal infiltrates dominant pulmonary nodules pneumothorax\par \par ADDENDUM\par Data review Feb 22/2019\par CBC White blood count 3.75 hemoglobin 16.6 hematocrit 49.0 platelet count 183,000\par TFT panel normal\par Hemoglobin A1c 5.4%\par HCV negative\par Total cholesterol 132\par HDL 49 triglycerides 79 LDL 67\par Vitamin D 38.6\par Serum electrolytes normal\par Serum glucose 102\par Serum calcium 9.5\par Total bilirubin 2.1\par AST 25 ALT minimally elevated 56\par Alkaline phosphatase 89 normal range noted to 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

## 2019-02-22 NOTE — DISCUSSION/SUMMARY
[FreeTextEntry1] : Last office appointment May 2018\par Reminder was noted regarding pulmonary nodules\par Data review patient had an emergency room visit at [facility] January 15, 2018\par CT chest abdomen pelvis was completed\par Chest findings noted no evidence of mediastinal mass lymphadenopathy\par Reported multiple tiny peripheral noncalcified nodules 3 mm largest at lower lobes with no dominant nodule or mass\par \par  history of active sleep apnea fatty liver she will bears disease hypertension obstructive sleep apnea hairy cell leukemia coronary artery disease\par Occasions of from note April 25, 2018\par Aspirin 81 mg a day of oral statin 20 mg Plavix 75 mg co-Q10\par Metoprolol ER 25 mg versus Toprol XL 50 mg will need to clarify\par Vitamin D 2000 units\par labs pending

## 2019-02-22 NOTE — REVIEW OF SYSTEMS
[Negative] : Sleep Disorder [FreeTextEntry9] : HTN  ASHD with Hx PTCA stent [FreeTextEntry7] : colonic polyps [de-identified] : Hairy Cell Leukemia stablke exam Barber Soto MD Heme

## 2019-04-12 ENCOUNTER — OUTPATIENT (OUTPATIENT)
Dept: OUTPATIENT SERVICES | Facility: HOSPITAL | Age: 64
LOS: 1 days | Discharge: HOME | End: 2019-04-12

## 2019-04-12 DIAGNOSIS — C91.40 HAIRY CELL LEUKEMIA NOT HAVING ACHIEVED REMISSION: ICD-10-CM

## 2019-04-12 DIAGNOSIS — Z95.5 PRESENCE OF CORONARY ANGIOPLASTY IMPLANT AND GRAFT: Chronic | ICD-10-CM

## 2019-04-29 ENCOUNTER — APPOINTMENT (OUTPATIENT)
Dept: HEMATOLOGY ONCOLOGY | Facility: CLINIC | Age: 64
End: 2019-04-29
Payer: COMMERCIAL

## 2019-04-29 ENCOUNTER — RESULT REVIEW (OUTPATIENT)
Age: 64
End: 2019-04-29

## 2019-04-29 VITALS
SYSTOLIC BLOOD PRESSURE: 130 MMHG | OXYGEN SATURATION: 99 % | HEART RATE: 65 BPM | BODY MASS INDEX: 29.5 KG/M2 | RESPIRATION RATE: 16 BRPM | WEIGHT: 194.01 LBS | TEMPERATURE: 98 F | DIASTOLIC BLOOD PRESSURE: 70 MMHG

## 2019-04-29 PROCEDURE — 99213 OFFICE O/P EST LOW 20 MIN: CPT

## 2019-04-29 NOTE — REASON FOR VISIT
[Follow-Up Visit] : a follow-up visit for [FreeTextEntry2] : Hairy cell leukemia treated with cladribine 2014 in first remission

## 2019-04-29 NOTE — ASSESSMENT
[FreeTextEntry1] : Hairy cell leukemia treated with cladribine 2014 in first remission\par \par Continues in remission\par \par RTO 6 months-1 year

## 2019-04-29 NOTE — HISTORY OF PRESENT ILLNESS
[de-identified] : Had colonoscopy with polyps removed\par Seeing Dr. Palmer regularly\par \par No symptoms

## 2019-05-20 ENCOUNTER — APPOINTMENT (OUTPATIENT)
Dept: PULMONOLOGY | Facility: CLINIC | Age: 64
End: 2019-05-20
Payer: COMMERCIAL

## 2019-05-20 VITALS — OXYGEN SATURATION: 98 % | HEART RATE: 61 BPM | SYSTOLIC BLOOD PRESSURE: 110 MMHG | DIASTOLIC BLOOD PRESSURE: 68 MMHG

## 2019-05-20 DIAGNOSIS — J32.9 CHRONIC SINUSITIS, UNSPECIFIED: ICD-10-CM

## 2019-05-20 PROCEDURE — 71046 X-RAY EXAM CHEST 2 VIEWS: CPT

## 2019-05-20 PROCEDURE — 99213 OFFICE O/P EST LOW 20 MIN: CPT | Mod: 25

## 2019-05-20 PROCEDURE — 94060 EVALUATION OF WHEEZING: CPT

## 2019-05-20 RX ORDER — NEOMYCIN AND POLYMYXIN B SULFATES AND DEXAMETHASONE 3.5; 10000; 1 MG/G; [IU]/G; MG/G
3.5-10000-0.1 OINTMENT OPHTHALMIC
Qty: 4 | Refills: 0 | Status: DISCONTINUED | COMMUNITY
Start: 2019-05-14

## 2019-05-20 RX ORDER — METHYLPREDNISOLONE 4 MG/1
4 TABLET ORAL
Qty: 21 | Refills: 0 | Status: DISCONTINUED | COMMUNITY
Start: 2019-01-04

## 2019-05-20 RX ORDER — METOPROLOL SUCCINATE 100 MG/1
100 TABLET, EXTENDED RELEASE ORAL
Qty: 30 | Refills: 0 | Status: DISCONTINUED | COMMUNITY
Start: 2019-05-13

## 2019-05-20 NOTE — PROCEDURE
[FreeTextEntry1] : Chest x-ray PA lateral projection may 20th 2019\par Borderline cardiomegaly\par Uncoiled aorta\par Not exclude some trimline right lower lung zone suggestive of possible bronchiectatic changes\par No parenchymal infiltrates, pulmonary consolidation, pneumothorax, pleural effusion,\par Mediastinum hilum normal\par \par EKG 2/21/2019\par sinus bradycardia\par CXR PA lateral 2/21/2019\par  Colonoscopy up to date\par \par ADDENDUM\par Data review Feb 22/2019\par CBC White blood count 3.75 hemoglobin 16.6 hematocrit 49.0 platelet count 183,000\par TFT panel normal\par Hemoglobin A1c 5.4%\par HCV negative\par Total cholesterol 132\par HDL 49 triglycerides 79 LDL 67\par Vitamin D 38.6\par Serum electrolytes normal\par Serum glucose 102\par Serum calcium 9.5\par Total bilirubin 2.1\par AST 25 ALT minimally elevated 56\par Alkaline phosphatase 89 normal range noted to 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

## 2019-05-20 NOTE — REVIEW OF SYSTEMS
[Nasal Congestion] : nasal congestion [Sinus Problems] : sinus problems [Cough] : cough [Sputum] : sputum  [Chest Tightness] : chest tightness [Wheezing] : wheezing [Negative] : Sleep Disorder [Dry Eyes] : no dryness of the eyes [Eye Irritation] : no ~T irritation of the eyes [Ear Disturbance] : no ear disturbance [Epistaxis] : no nosebleeds [Postnasal Drip] : no postnasal drip [Sore Throat] : no sore throat [Dry Mouth] : no dry mouth [Mouth Ulcers] : no mouth ulcers [Hemoptysis] : no hemoptysis [Dyspnea] : no dyspnea [Pleuritic Pain] : no pleuritic pain [Frequent URIs] : no frequent upper respiratory infections [FreeTextEntry9] : HTN  ASHD with Hx PTCA stent [FreeTextEntry7] : colonic polyps [de-identified] : Hairy Cell Leukemia stablke exam Barber Soto MD Heme

## 2019-05-20 NOTE — DISCUSSION/SUMMARY
[FreeTextEntry1] : \par Reminder was noted regarding pulmonary nodules\par Data review patient had an emergency room visit at [facility] January 15, 2018\par CT chest abdomen pelvis was completed\par Chest findings noted no evidence of mediastinal mass lymphadenopathy\par Reported multiple tiny peripheral noncalcified nodules 3 mm largest at lower lobes with no dominant nodule or mass\par \par  history of active sleep apnea fatty liver , disease hypertension obstructive sleep apnea hairy cell leukemia coronary artery disease\par Occasions of from note April 25, 2018\par Aspirin 81 mg a day of oral statin 20 mg Plavix 75 mg co-Q10\par Metoprolol ER 25 mg versus Toprol XL 50 mg will need to clarify\par Vitamin D 2000 units\par Will advise patient office follow-up and address issue regarding CAT scan of the chest\par \par Bronchitis\par  Sinusitis\par medrol bhupendra\par  Antibx Ceftin 500 mg BID x t days

## 2019-06-20 ENCOUNTER — APPOINTMENT (OUTPATIENT)
Dept: PULMONOLOGY | Facility: CLINIC | Age: 64
End: 2019-06-20

## 2019-06-26 ENCOUNTER — NON-APPOINTMENT (OUTPATIENT)
Age: 64
End: 2019-06-26

## 2019-06-26 ENCOUNTER — APPOINTMENT (OUTPATIENT)
Dept: PULMONOLOGY | Facility: CLINIC | Age: 64
End: 2019-06-26
Payer: COMMERCIAL

## 2019-06-26 VITALS
DIASTOLIC BLOOD PRESSURE: 76 MMHG | SYSTOLIC BLOOD PRESSURE: 131 MMHG | OXYGEN SATURATION: 97 % | HEART RATE: 59 BPM | TEMPERATURE: 98.6 F

## 2019-06-26 PROCEDURE — 93000 ELECTROCARDIOGRAM COMPLETE: CPT

## 2019-06-26 PROCEDURE — 99495 TRANSJ CARE MGMT MOD F2F 14D: CPT | Mod: 25

## 2019-06-26 RX ORDER — CLONAZEPAM 0.5 MG/1
0.5 TABLET ORAL
Qty: 14 | Refills: 0 | Status: DISCONTINUED | COMMUNITY
Start: 2019-06-21

## 2019-06-26 RX ORDER — IBUPROFEN 400 MG/1
400 TABLET, FILM COATED ORAL
Qty: 21 | Refills: 0 | Status: DISCONTINUED | COMMUNITY
Start: 2019-06-21

## 2019-06-26 RX ORDER — TOPIRAMATE 25 MG/1
25 TABLET, FILM COATED ORAL
Qty: 30 | Refills: 0 | Status: DISCONTINUED | COMMUNITY
Start: 2019-06-21

## 2019-06-26 RX ORDER — CEFUROXIME AXETIL 500 MG/1
500 TABLET ORAL
Qty: 14 | Refills: 0 | Status: DISCONTINUED | COMMUNITY
Start: 2019-05-20 | End: 2019-06-26

## 2019-06-26 NOTE — PROCEDURE
[FreeTextEntry1] : Chest x-ray PA lateral projection may 20th 2019\par Borderline cardiomegaly\par Uncoiled aorta\par Not exclude sometramline right lower lung zone suggestive of possible bronchiectatic changes\par No parenchymal infiltrates, pulmonary consolidation, pneumothorax, pleural effusion,\par Mediastinum hilum normal\par \par EKG  6/26/2019\par sinus bradycardia\par \par  Colonoscopy up to date\par \par ADDENDUM\par Data review Feb 22/2019\par CBC White blood count 3.75 hemoglobin 16.6 hematocrit 49.0 platelet count 183,000\par TFT panel normal\par Hemoglobin A1c 5.4%\par HCV negative\par Total cholesterol 132\par HDL 49 triglycerides 79 LDL 67\par Vitamin D 38.6\par Serum electrolytes normal\par Serum glucose 102\par Serum calcium 9.5\par Total bilirubin 2.1\par AST 25 ALT minimally elevated 56\par Alkaline phosphatase 89 normal range noted to 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

## 2019-06-26 NOTE — PHYSICAL EXAM
[Normal Appearance] : normal appearance [General Appearance - Well Developed] : well developed [Well Groomed] : well groomed [No Deformities] : no deformities [General Appearance - Well Nourished] : well nourished [Normal Conjunctiva] : the conjunctiva exhibited no abnormalities [Eyelids - No Xanthelasma] : the eyelids demonstrated no xanthelasmas [General Appearance - In No Acute Distress] : no acute distress [Normal Oropharynx] : normal oropharynx [Neck Appearance] : the appearance of the neck was normal [I] : I [Thyroid Diffuse Enlargement] : the thyroid was not enlarged [Neck Cervical Mass (___cm)] : no neck mass was observed [Jugular Venous Distention Increased] : there was no jugular-venous distention [Heart Rate And Rhythm] : heart rate and rhythm were normal [Edema] : no peripheral edema present [Arterial Pulses Normal] : the arterial pulses were normal [Heart Sounds] : normal S1 and S2 [Murmurs] : no murmurs present [Respiration, Rhythm And Depth] : normal respiratory rhythm and effort [Veins - Varicosity Changes] : no varicosital changes were noted in the lower extremities [Exaggerated Use Of Accessory Muscles For Inspiration] : no accessory muscle use [Chest Palpation] : palpation of the chest revealed no abnormalities [Auscultation Breath Sounds / Voice Sounds] : lungs were clear to auscultation bilaterally [Bowel Sounds] : normal bowel sounds [Lungs Percussion] : the lungs were normal to percussion [Abdomen Soft] : soft [Abdomen Tenderness] : non-tender [Abdomen Mass (___ Cm)] : no abdominal mass palpated [Abnormal Walk] : normal gait [Gait - Sufficient For Exercise Testing] : the gait was sufficient for exercise testing [Nail Clubbing] : no clubbing of the fingernails [Cyanosis, Localized] : no localized cyanosis [Petechial Hemorrhages (___cm)] : no petechial hemorrhages [Skin Color & Pigmentation] : normal skin color and pigmentation [] : no rash [No Venous Stasis] : no venous stasis [Skin Lesions] : no skin lesions [No Skin Ulcers] : no skin ulcer [No Xanthoma] : no  xanthoma was observed [Deep Tendon Reflexes (DTR)] : deep tendon reflexes were 2+ and symmetric [Sensation] : the sensory exam was normal to light touch and pinprick [No Focal Deficits] : no focal deficits [Motor Exam] : the motor exam was normal [Oriented To Time, Place, And Person] : oriented to person, place, and time [Impaired Insight] : insight and judgment were intact [Mood] : the mood was normal [Affect] : the affect was normal [FreeTextEntry1] : Negative nystagmus

## 2019-06-26 NOTE — DISCUSSION/SUMMARY
[FreeTextEntry1] : \par Reminder was noted regarding pulmonary nodules\par Data review patient had an emergency room visit at [facility] January 15, 2018\par CT chest abdomen pelvis was completed\par Chest findings noted no evidence of mediastinal mass lymphadenopathy\par Reported multiple tiny peripheral noncalcified nodules 3 mm largest at lower lobes with no dominant nodule or mass\par \par  history of active sleep apnea fatty liver , disease hypertension obstructive sleep apnea hairy cell leukemia coronary artery disease\par Occasions of from note April 25, 2018\par Aspirin 81 mg a day of oral statin 20 mg Plavix 75 mg co-Q10\par Metoprolol ER 25 mg versus Toprol XL 50 mg will need to clarify\par Vitamin D 2000 units\par \par Patient and wife requested an additional cardiologist for possible follow-up opinion\par Provided Carroll Alonso MD of [facility] cardiology

## 2019-06-26 NOTE — REVIEW OF SYSTEMS
[Nasal Congestion] : nasal congestion [Sinus Problems] : sinus problems [Cough] : cough [Sputum] : sputum  [Chest Tightness] : chest tightness [Wheezing] : wheezing [As Noted in HPI] : as noted in HPI [Dizziness] : dizziness [Negative] : Sleep Disorder [Dry Eyes] : no dryness of the eyes [Eye Irritation] : no ~T irritation of the eyes [Ear Disturbance] : no ear disturbance [Epistaxis] : no nosebleeds [Postnasal Drip] : no postnasal drip [Sore Throat] : no sore throat [Dry Mouth] : no dry mouth [Mouth Ulcers] : no mouth ulcers [Hemoptysis] : no hemoptysis [Pleuritic Pain] : no pleuritic pain [Dyspnea] : no dyspnea [Frequent URIs] : no frequent upper respiratory infections [FreeTextEntry9] : HTN  ASHD with Hx PTCA stent [FreeTextEntry7] : colonic polyps [de-identified] : Hairy Cell Leukemia stablke exam Barber Soto MD Heme

## 2019-06-26 NOTE — HISTORY OF PRESENT ILLNESS
[Improved] : have improved [None] : The patient is not currently on any medications [FreeTextEntry1] : Here for hospital f/u. Hospitalized at [facility] 6/19/19 for vertigo, d/c'd 6/21/19.\par Feeling better now. \par MRI Brain and Neck verbal negative\par ENT Neuro consults completed\par Eplier PT with improvement of vertigo\par ECHO reported no acute finding

## 2019-08-05 ENCOUNTER — APPOINTMENT (OUTPATIENT)
Dept: PULMONOLOGY | Facility: CLINIC | Age: 64
End: 2019-08-05

## 2019-08-16 ENCOUNTER — RX RENEWAL (OUTPATIENT)
Age: 64
End: 2019-08-16

## 2019-08-16 RX ORDER — ALBUTEROL SULFATE 90 UG/1
108 (90 BASE) AEROSOL, METERED RESPIRATORY (INHALATION)
Qty: 1 | Refills: 3 | Status: ACTIVE | COMMUNITY
Start: 2019-05-20 | End: 1900-01-01

## 2019-08-18 ENCOUNTER — TRANSCRIPTION ENCOUNTER (OUTPATIENT)
Age: 64
End: 2019-08-18

## 2019-08-21 ENCOUNTER — APPOINTMENT (OUTPATIENT)
Dept: PULMONOLOGY | Facility: CLINIC | Age: 64
End: 2019-08-21
Payer: COMMERCIAL

## 2019-08-21 VITALS
TEMPERATURE: 97.9 F | HEART RATE: 67 BPM | DIASTOLIC BLOOD PRESSURE: 82 MMHG | SYSTOLIC BLOOD PRESSURE: 140 MMHG | OXYGEN SATURATION: 96 %

## 2019-08-21 PROCEDURE — 71046 X-RAY EXAM CHEST 2 VIEWS: CPT

## 2019-08-21 PROCEDURE — 95012 NITRIC OXIDE EXP GAS DETER: CPT

## 2019-08-21 PROCEDURE — 94729 DIFFUSING CAPACITY: CPT

## 2019-08-21 PROCEDURE — 94727 GAS DIL/WSHOT DETER LNG VOL: CPT

## 2019-08-21 PROCEDURE — 94060 EVALUATION OF WHEEZING: CPT

## 2019-08-21 PROCEDURE — 99214 OFFICE O/P EST MOD 30 MIN: CPT | Mod: 25

## 2019-08-21 RX ORDER — SODIUM SULFATE, POTASSIUM SULFATE, MAGNESIUM SULFATE 17.5; 3.13; 1.6 G/ML; G/ML; G/ML
17.5-3.13-1.6 SOLUTION, CONCENTRATE ORAL
Qty: 1 | Refills: 0 | Status: DISCONTINUED | COMMUNITY
Start: 2018-07-20 | End: 2019-08-21

## 2019-08-21 RX ORDER — METHYLPREDNISOLONE 4 MG/1
4 TABLET ORAL
Qty: 1 | Refills: 0 | Status: DISCONTINUED | COMMUNITY
Start: 2019-05-20 | End: 2019-08-21

## 2019-08-21 NOTE — PROCEDURE
[FreeTextEntry1] : Exhaled nitric oxide normal range 11 ppb\par Chest x-ray PA lateral August 21, 2019\par Borderline cardiomegaly\par Grossly clear lung fields without parenchymal infiltrates pleural effusions dominant pulmonary nodules\par Crowding of markings at the right lower lung zone which is unchanged dating back years and stable\par Impression no evidence of pneumonia\par PFT August 21, 2019\par Spirometry normal\par No response to bronchodilator at FEV1\par Lung volumes normal with total lung capacity 87% predicted.\par Diffusion normal 82% of predicted.\par There is no interval decline of flow rates compared to prior studies dating back from April 25, 2018 as well as May 20, 2019\par \par EKG  6/26/2019\par sinus bradycardia\par \par  Colonoscopy up to date\par \par ADDENDUM\par Data review Feb 22/2019\par CBC White blood count 3.75 hemoglobin 16.6 hematocrit 49.0 platelet count 183,000\par TFT panel normal\par Hemoglobin A1c 5.4%\par HCV negative\par Total cholesterol 132\par HDL 49 triglycerides 79 LDL 67\par Vitamin D 38.6\par Serum electrolytes normal\par Serum glucose 102\par Serum calcium 9.5\par Total bilirubin 2.1\par AST 25 ALT minimally elevated 56\par Alkaline phosphatase 89 normal range noted to 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

## 2019-08-21 NOTE — PHYSICAL EXAM
[Normal Appearance] : normal appearance [General Appearance - Well Developed] : well developed [Well Groomed] : well groomed [General Appearance - Well Nourished] : well nourished [No Deformities] : no deformities [Normal Conjunctiva] : the conjunctiva exhibited no abnormalities [Eyelids - No Xanthelasma] : the eyelids demonstrated no xanthelasmas [General Appearance - In No Acute Distress] : no acute distress [Normal Oropharynx] : normal oropharynx [I] : I [Neck Appearance] : the appearance of the neck was normal [Neck Cervical Mass (___cm)] : no neck mass was observed [Jugular Venous Distention Increased] : there was no jugular-venous distention [Heart Sounds] : normal S1 and S2 [Thyroid Diffuse Enlargement] : the thyroid was not enlarged [Heart Rate And Rhythm] : heart rate and rhythm were normal [Murmurs] : no murmurs present [Edema] : no peripheral edema present [Arterial Pulses Normal] : the arterial pulses were normal [Veins - Varicosity Changes] : no varicosital changes were noted in the lower extremities [Respiration, Rhythm And Depth] : normal respiratory rhythm and effort [Auscultation Breath Sounds / Voice Sounds] : lungs were clear to auscultation bilaterally [Exaggerated Use Of Accessory Muscles For Inspiration] : no accessory muscle use [Lungs Percussion] : the lungs were normal to percussion [Chest Palpation] : palpation of the chest revealed no abnormalities [Bowel Sounds] : normal bowel sounds [Abdomen Soft] : soft [Abdomen Tenderness] : non-tender [Abnormal Walk] : normal gait [Abdomen Mass (___ Cm)] : no abdominal mass palpated [Gait - Sufficient For Exercise Testing] : the gait was sufficient for exercise testing [Nail Clubbing] : no clubbing of the fingernails [Cyanosis, Localized] : no localized cyanosis [Petechial Hemorrhages (___cm)] : no petechial hemorrhages [Skin Color & Pigmentation] : normal skin color and pigmentation [] : no rash [No Venous Stasis] : no venous stasis [Skin Lesions] : no skin lesions [No Skin Ulcers] : no skin ulcer [No Xanthoma] : no  xanthoma was observed [Deep Tendon Reflexes (DTR)] : deep tendon reflexes were 2+ and symmetric [Motor Exam] : the motor exam was normal [Sensation] : the sensory exam was normal to light touch and pinprick [No Focal Deficits] : no focal deficits [Oriented To Time, Place, And Person] : oriented to person, place, and time [Impaired Insight] : insight and judgment were intact [Affect] : the affect was normal [Mood] : the mood was normal [FreeTextEntry1] : Negative nystagmus

## 2019-08-21 NOTE — REVIEW OF SYSTEMS
[Nasal Congestion] : nasal congestion [Sinus Problems] : sinus problems [Cough] : cough [Sputum] : sputum  [As Noted in HPI] : as noted in HPI [Wheezing] : wheezing [Dizziness] : dizziness [Negative] : Sleep Disorder [Dry Eyes] : no dryness of the eyes [Eye Irritation] : no ~T irritation of the eyes [Ear Disturbance] : no ear disturbance [Epistaxis] : no nosebleeds [Postnasal Drip] : no postnasal drip [Sore Throat] : no sore throat [Dry Mouth] : no dry mouth [Mouth Ulcers] : no mouth ulcers [Hemoptysis] : no hemoptysis [Chest Tightness] : no chest tightness [Dyspnea] : no dyspnea [Pleuritic Pain] : no pleuritic pain [Frequent URIs] : no frequent upper respiratory infections [FreeTextEntry9] : HTN  ASHD with Hx PTCA stent [FreeTextEntry7] : colonic polyps [de-identified] : Hairy Cell Leukemia stablke exam Barber Soto MD Heme

## 2019-08-21 NOTE — DISCUSSION/SUMMARY
[FreeTextEntry1] : Favor viral tracheal bronchitis\par Complete 5-day course of steroids and antibiotic\par No indication for additional antibiotics or long-term steroids as clinical exam is unremarkable\par Can add over-the-counter Mucinex DM 1 tablet p.o. twice daily as needed\par Flu vaccination in the fall and schedule appointment\par Will notify if no improvement or any deterioration in respiratory status and be reevaluated on a acute urgent basis as necessary\par Reminder was noted regarding pulmonary nodules\par Data review patient had an emergency room visit at [facility] January 15, 2018\par CT chest abdomen pelvis was completed\par Chest findings noted no evidence of mediastinal mass lymphadenopathy\par Reported multiple tiny peripheral noncalcified nodules 3 mm largest at lower lobes with no dominant nodule or mass\par \par  history of active sleep apnea fatty liver , disease hypertension obstructive sleep apnea hairy cell leukemia coronary artery disease\par \par \par Patient and wife requested an additional cardiologist for possible follow-up opinion\par Provided Carroll lAonso MD of [facility] cardiology

## 2019-08-21 NOTE — HISTORY OF PRESENT ILLNESS
[None] : The patient is not currently on any medications [FreeTextEntry1] : Status post just go [facility] urgent care evaluation several days ago for a cough with yellow sputum although he did not report wheeze E exam reported wheezing\par He was treated on 500 mg daily of Zithromax x5 days with prednisone 20 mill grams x5 days\par He returns today still feeling somewhat under the weather with some chest congestion and cough does not believe he is wheezing at this time\par No purulent sputum\par Denies fevers chills or sweats\par \par \par Here for hospital f/u. Hospitalized at [facility] 6/19/19 for vertigo, d/c'd 6/21/19.\par Feeling better now. \par MRI Brain and Neck verbal negative\par ENT Neuro consults completed\par Eplier PT with improvement of vertigo\par ECHO reported no acute finding

## 2019-08-21 NOTE — HISTORY OF PRESENT ILLNESS
[None] : The patient is not currently on any medications [FreeTextEntry1] : Status post just go [facility] urgent care evaluation several days ago for a cough with yellow sputum although he did not report wheeze E exam reported wheezing\par He was treated on 500 mg daily of Zithromax x5 days with prednisone 20 mill grams x5 days\par He returns today still feeling somewhat under the weather with some chest congestion and cough does not believe he is wheezing at this time\par No purulent sputum\par Denies fevers chills or sweats\par \par \par Here for hospital f/u. Hospitalized at [facility] 6/19/19 for vertigo, d/c'd 6/21/19.\par Feeling better now. \par MRI Brain and Neck verbal negative\par ENT Neuro consults completed\par Eplier PT with improvement of vertigo\par ECHO reported no acute finding

## 2019-08-21 NOTE — DISCUSSION/SUMMARY
[FreeTextEntry1] : Favor viral tracheal bronchitis\par Complete 5-day course of steroids and antibiotic\par No indication for additional antibiotics or long-term steroids as clinical exam is unremarkable\par Can add over-the-counter Mucinex DM 1 tablet p.o. twice daily as needed\par Flu vaccination in the fall and schedule appointment\par Will notify if no improvement or any deterioration in respiratory status and be reevaluated on a acute urgent basis as necessary\par Reminder was noted regarding pulmonary nodules\par Data review patient had an emergency room visit at [facility] January 15, 2018\par CT chest abdomen pelvis was completed\par Chest findings noted no evidence of mediastinal mass lymphadenopathy\par Reported multiple tiny peripheral noncalcified nodules 3 mm largest at lower lobes with no dominant nodule or mass\par \par  history of active sleep apnea fatty liver , disease hypertension obstructive sleep apnea hairy cell leukemia coronary artery disease\par \par \par Patient and wife requested an additional cardiologist for possible follow-up opinion\par Provided Carroll Alonso MD of [facility] cardiology

## 2019-08-21 NOTE — REVIEW OF SYSTEMS
[Nasal Congestion] : nasal congestion [Sinus Problems] : sinus problems [Cough] : cough [Sputum] : sputum  [As Noted in HPI] : as noted in HPI [Wheezing] : wheezing [Dizziness] : dizziness [Negative] : Sleep Disorder [Dry Eyes] : no dryness of the eyes [Eye Irritation] : no ~T irritation of the eyes [Ear Disturbance] : no ear disturbance [Epistaxis] : no nosebleeds [Postnasal Drip] : no postnasal drip [Sore Throat] : no sore throat [Dry Mouth] : no dry mouth [Mouth Ulcers] : no mouth ulcers [Hemoptysis] : no hemoptysis [Dyspnea] : no dyspnea [Chest Tightness] : no chest tightness [Pleuritic Pain] : no pleuritic pain [Frequent URIs] : no frequent upper respiratory infections [FreeTextEntry9] : HTN  ASHD with Hx PTCA stent [FreeTextEntry7] : colonic polyps [de-identified] : Hairy Cell Leukemia stablke exam Barber Soto MD Heme

## 2019-08-26 ENCOUNTER — APPOINTMENT (OUTPATIENT)
Dept: PULMONOLOGY | Facility: CLINIC | Age: 64
End: 2019-08-26
Payer: COMMERCIAL

## 2019-08-26 VITALS
TEMPERATURE: 98.3 F | SYSTOLIC BLOOD PRESSURE: 138 MMHG | OXYGEN SATURATION: 97 % | DIASTOLIC BLOOD PRESSURE: 81 MMHG | HEART RATE: 68 BPM

## 2019-08-26 PROCEDURE — 99213 OFFICE O/P EST LOW 20 MIN: CPT

## 2019-08-26 RX ORDER — AZITHROMYCIN 500 MG/1
500 TABLET, FILM COATED ORAL
Qty: 5 | Refills: 0 | Status: DISCONTINUED | COMMUNITY
Start: 2019-08-18

## 2019-08-26 NOTE — HISTORY OF PRESENT ILLNESS
[FreeTextEntry1] : Still not feeling well. Dry cough. Completed course of antibiotics Thursday evening. Developed rash on groin, buttocks.

## 2019-08-26 NOTE — PHYSICAL EXAM
[General Appearance - Well Developed] : well developed [Normal Appearance] : normal appearance [General Appearance - Well Nourished] : well nourished [Well Groomed] : well groomed [General Appearance - In No Acute Distress] : no acute distress [No Deformities] : no deformities [Normal Conjunctiva] : the conjunctiva exhibited no abnormalities [Eyelids - No Xanthelasma] : the eyelids demonstrated no xanthelasmas [Normal Oropharynx] : normal oropharynx [I] : I [Neck Cervical Mass (___cm)] : no neck mass was observed [Neck Appearance] : the appearance of the neck was normal [Jugular Venous Distention Increased] : there was no jugular-venous distention [Heart Rate And Rhythm] : heart rate and rhythm were normal [Thyroid Diffuse Enlargement] : the thyroid was not enlarged [Murmurs] : no murmurs present [Heart Sounds] : normal S1 and S2 [Veins - Varicosity Changes] : no varicosital changes were noted in the lower extremities [Edema] : no peripheral edema present [Arterial Pulses Normal] : the arterial pulses were normal [Respiration, Rhythm And Depth] : normal respiratory rhythm and effort [Exaggerated Use Of Accessory Muscles For Inspiration] : no accessory muscle use [Auscultation Breath Sounds / Voice Sounds] : lungs were clear to auscultation bilaterally [Chest Palpation] : palpation of the chest revealed no abnormalities [Bowel Sounds] : normal bowel sounds [Lungs Percussion] : the lungs were normal to percussion [Abdomen Soft] : soft [Abdomen Tenderness] : non-tender [Abnormal Walk] : normal gait [Abdomen Mass (___ Cm)] : no abdominal mass palpated [Gait - Sufficient For Exercise Testing] : the gait was sufficient for exercise testing [Nail Clubbing] : no clubbing of the fingernails [Petechial Hemorrhages (___cm)] : no petechial hemorrhages [] : no ischemic changes [Cyanosis, Localized] : no localized cyanosis [No Venous Stasis] : no venous stasis [No Skin Ulcers] : no skin ulcer [Skin Lesions] : no skin lesions [No Xanthoma] : no  xanthoma was observed [Deep Tendon Reflexes (DTR)] : deep tendon reflexes were 2+ and symmetric [Sensation] : the sensory exam was normal to light touch and pinprick [No Focal Deficits] : no focal deficits [Motor Exam] : the motor exam was normal [Oriented To Time, Place, And Person] : oriented to person, place, and time [Impaired Insight] : insight and judgment were intact [Affect] : the affect was normal [Mood] : the mood was normal [FreeTextEntry1] : Negative nystagmus

## 2019-08-26 NOTE — REVIEW OF SYSTEMS
[Nasal Congestion] : nasal congestion [Sinus Problems] : sinus problems [Sputum] : sputum  [Cough] : cough [Dizziness] : dizziness [As Noted in HPI] : as noted in HPI [Wheezing] : wheezing [Negative] : Sleep Disorder [Dry Eyes] : no dryness of the eyes [Eye Irritation] : no ~T irritation of the eyes [Ear Disturbance] : no ear disturbance [Epistaxis] : no nosebleeds [Postnasal Drip] : no postnasal drip [Sore Throat] : no sore throat [Dry Mouth] : no dry mouth [Hemoptysis] : no hemoptysis [Mouth Ulcers] : no mouth ulcers [Chest Tightness] : no chest tightness [Dyspnea] : no dyspnea [Pleuritic Pain] : no pleuritic pain [Frequent URIs] : no frequent upper respiratory infections [FreeTextEntry7] : colonic polyps [FreeTextEntry9] : HTN  ASHD with Hx PTCA stent [de-identified] : Hairy Cell Leukemia stablke exam Barber Soto MD Heme

## 2019-08-26 NOTE — DISCUSSION/SUMMARY
[FreeTextEntry1] : Yeast fungal Rash\par viral tracheal bronchitis\par Complete 5-day course of steroids and antibiotic\par Retx Doxy 1oomg BID\par  Add diflucan 100 mg X 7 days\par No indication for additional antibiotics or long-term steroids as clinical exam is unremarkable\par Can add over-the-counter Mucinex DM 1 tablet p.o. twice daily as needed\par Flu vaccination in the fall and schedule appointment\par Will notify if no improvement or any deterioration in respiratory status and be reevaluated on a acute urgent basis as necessary\par Reminder was noted regarding pulmonary nodules\par Data review patient had an emergency room visit at [facility] January 15, 2018\par CT chest abdomen pelvis was completed\par Chest findings noted no evidence of mediastinal mass lymphadenopathy\par Reported multiple tiny peripheral noncalcified nodules 3 mm largest at lower lobes with no dominant nodule or mass\par \par  history of active sleep apnea fatty liver , disease hypertension obstructive sleep apnea hairy cell leukemia coronary artery disease\par \par \par Patient and wife requested an additional cardiologist for possible follow-up opinion\par Provided Carroll Alonso MD of [facility] cardiology

## 2019-10-07 ENCOUNTER — APPOINTMENT (OUTPATIENT)
Dept: PULMONOLOGY | Facility: CLINIC | Age: 64
End: 2019-10-07

## 2019-12-31 ENCOUNTER — APPOINTMENT (OUTPATIENT)
Dept: PULMONOLOGY | Facility: CLINIC | Age: 64
End: 2019-12-31
Payer: COMMERCIAL

## 2019-12-31 VITALS
HEIGHT: 68 IN | OXYGEN SATURATION: 94 % | TEMPERATURE: 98.5 F | BODY MASS INDEX: 29.4 KG/M2 | DIASTOLIC BLOOD PRESSURE: 68 MMHG | WEIGHT: 194 LBS | SYSTOLIC BLOOD PRESSURE: 108 MMHG | HEART RATE: 64 BPM | RESPIRATION RATE: 16 BRPM

## 2019-12-31 PROCEDURE — 71046 X-RAY EXAM CHEST 2 VIEWS: CPT

## 2019-12-31 PROCEDURE — 99214 OFFICE O/P EST MOD 30 MIN: CPT | Mod: 25

## 2019-12-31 PROCEDURE — 95012 NITRIC OXIDE EXP GAS DETER: CPT

## 2019-12-31 PROCEDURE — 94060 EVALUATION OF WHEEZING: CPT

## 2019-12-31 NOTE — HISTORY OF PRESENT ILLNESS
[FreeTextEntry1] : Went to urgent care on Xmas day was told had an URI was given 15mg Prednisone finished on Monday, still with residual cough and some greenish mucus production. +PND and some chest congestion, denies major SOB.

## 2019-12-31 NOTE — PROCEDURE
[FreeTextEntry1] : Chest x-ray PA and lateral views performed in my office today showed clear lungs, no evidence of infiltrates or pleural effusions.\par \par Spirometry performed in the office today was within normal limits, with no improvement post bronchodilator\par \par Exhaled nitric oxide level is 15  PPB\par

## 2019-12-31 NOTE — PHYSICAL EXAM
[General Appearance - Well Developed] : well developed [Normal Appearance] : normal appearance [General Appearance - Well Nourished] : well nourished [Well Groomed] : well groomed [General Appearance - In No Acute Distress] : no acute distress [No Deformities] : no deformities [Normal Oropharynx] : normal oropharynx [Murmurs] : no murmurs present [Heart Rate And Rhythm] : heart rate and rhythm were normal [Heart Sounds] : normal S1 and S2 [Respiration, Rhythm And Depth] : normal respiratory rhythm and effort [Auscultation Breath Sounds / Voice Sounds] : lungs were clear to auscultation bilaterally [Exaggerated Use Of Accessory Muscles For Inspiration] : no accessory muscle use [Abdomen Soft] : soft [Abdomen Tenderness] : non-tender [Abdomen Mass (___ Cm)] : no abdominal mass palpated [Cyanosis, Localized] : no localized cyanosis [Nail Clubbing] : no clubbing of the fingernails [] : no ischemic changes [Petechial Hemorrhages (___cm)] : no petechial hemorrhages

## 2020-01-13 ENCOUNTER — APPOINTMENT (OUTPATIENT)
Dept: PULMONOLOGY | Facility: CLINIC | Age: 65
End: 2020-01-13
Payer: COMMERCIAL

## 2020-01-13 VITALS
HEIGHT: 68 IN | SYSTOLIC BLOOD PRESSURE: 115 MMHG | HEART RATE: 56 BPM | BODY MASS INDEX: 28.79 KG/M2 | WEIGHT: 190 LBS | DIASTOLIC BLOOD PRESSURE: 76 MMHG | RESPIRATION RATE: 14 BRPM | TEMPERATURE: 98.1 F | OXYGEN SATURATION: 95 %

## 2020-01-13 DIAGNOSIS — Z87.09 PERSONAL HISTORY OF OTHER DISEASES OF THE RESPIRATORY SYSTEM: ICD-10-CM

## 2020-01-13 PROCEDURE — 36415 COLL VENOUS BLD VENIPUNCTURE: CPT

## 2020-01-13 PROCEDURE — 99213 OFFICE O/P EST LOW 20 MIN: CPT | Mod: 25

## 2020-01-13 RX ORDER — AZITHROMYCIN 250 MG/1
250 TABLET, FILM COATED ORAL
Qty: 6 | Refills: 1 | Status: DISCONTINUED | COMMUNITY
Start: 2019-12-31 | End: 2020-01-13

## 2020-01-13 RX ORDER — DOXYCYCLINE HYCLATE 100 MG/1
100 CAPSULE ORAL
Qty: 14 | Refills: 0 | Status: DISCONTINUED | COMMUNITY
Start: 2019-08-26 | End: 2020-01-13

## 2020-01-13 RX ORDER — CLOTRIMAZOLE AND BETAMETHASONE DIPROPIONATE 10; .5 MG/G; MG/G
1-0.05 CREAM TOPICAL TWICE DAILY
Qty: 1 | Refills: 2 | Status: DISCONTINUED | COMMUNITY
Start: 2019-08-26 | End: 2020-01-13

## 2020-01-13 RX ORDER — PREDNISONE 10 MG/1
10 TABLET ORAL DAILY
Qty: 20 | Refills: 0 | Status: DISCONTINUED | COMMUNITY
Start: 2019-05-20 | End: 2020-01-13

## 2020-01-13 RX ORDER — PREDNISONE 10 MG/1
10 TABLET ORAL
Qty: 18 | Refills: 0 | Status: DISCONTINUED | COMMUNITY
Start: 2019-12-31 | End: 2020-01-13

## 2020-01-13 RX ORDER — FLUCONAZOLE 100 MG/1
100 TABLET ORAL DAILY
Qty: 7 | Refills: 1 | Status: DISCONTINUED | COMMUNITY
Start: 2019-08-26 | End: 2020-01-13

## 2020-01-13 NOTE — PHYSICAL EXAM
[General Appearance - Well Developed] : well developed [Normal Appearance] : normal appearance [General Appearance - Well Nourished] : well nourished [Well Groomed] : well groomed [No Deformities] : no deformities [Normal Conjunctiva] : the conjunctiva exhibited no abnormalities [General Appearance - In No Acute Distress] : no acute distress [Eyelids - No Xanthelasma] : the eyelids demonstrated no xanthelasmas [Normal Oropharynx] : normal oropharynx [Neck Appearance] : the appearance of the neck was normal [I] : I [Neck Cervical Mass (___cm)] : no neck mass was observed [Thyroid Diffuse Enlargement] : the thyroid was not enlarged [Jugular Venous Distention Increased] : there was no jugular-venous distention [Heart Rate And Rhythm] : heart rate and rhythm were normal [Heart Sounds] : normal S1 and S2 [Murmurs] : no murmurs present [Arterial Pulses Normal] : the arterial pulses were normal [Edema] : no peripheral edema present [Veins - Varicosity Changes] : no varicosital changes were noted in the lower extremities [Respiration, Rhythm And Depth] : normal respiratory rhythm and effort [Exaggerated Use Of Accessory Muscles For Inspiration] : no accessory muscle use [Auscultation Breath Sounds / Voice Sounds] : lungs were clear to auscultation bilaterally [Chest Palpation] : palpation of the chest revealed no abnormalities [Lungs Percussion] : the lungs were normal to percussion [Bowel Sounds] : normal bowel sounds [Abdomen Soft] : soft [Abdomen Tenderness] : non-tender [Abdomen Mass (___ Cm)] : no abdominal mass palpated [Gait - Sufficient For Exercise Testing] : the gait was sufficient for exercise testing [Abnormal Walk] : normal gait [Nail Clubbing] : no clubbing of the fingernails [Cyanosis, Localized] : no localized cyanosis [] : no ischemic changes [No Venous Stasis] : no venous stasis [Petechial Hemorrhages (___cm)] : no petechial hemorrhages [Skin Lesions] : no skin lesions [No Skin Ulcers] : no skin ulcer [No Xanthoma] : no  xanthoma was observed [Deep Tendon Reflexes (DTR)] : deep tendon reflexes were 2+ and symmetric [Sensation] : the sensory exam was normal to light touch and pinprick [Motor Exam] : the motor exam was normal [No Focal Deficits] : no focal deficits [Oriented To Time, Place, And Person] : oriented to person, place, and time [Impaired Insight] : insight and judgment were intact [Affect] : the affect was normal [Mood] : the mood was normal [FreeTextEntry1] : buttock thigh yeast appearing rash

## 2020-01-13 NOTE — PROCEDURE
[FreeTextEntry1] : CXR reymundo at  last visit with Dr Xiong   negative\par Chest x-ray PA lateral August 21, 2019\par Borderline cardiomegaly\par Grossly clear lung fields without parenchymal infiltrates pleural effusions dominant pulmonary nodules\par Crowding of markings at the right lower lung zone which is unchanged dating back years and stable\par Impression no evidence of pneumonia\par PFT August 21, 2019\par Spirometry normal\par No response to bronchodilator at FEV1\par Lung volumes normal with total lung capacity 87% predicted.\par Diffusion normal 82% of predicted.\par There is no interval decline of flow rates compared to prior studies dating back from April 25, 2018 as well as May 20, 2019\par \par EKG  6/26/2019\par sinus bradycardia\par \par  Colonoscopy up to date\par \par ADDENDUM\par Data review Feb 22/2019\par CBC White blood count 3.75 hemoglobin 16.6 hematocrit 49.0 platelet count 183,000\par TFT panel normal\par Hemoglobin A1c 5.4%\par HCV negative\par Total cholesterol 132\par HDL 49 triglycerides 79 LDL 67\par Vitamin D 38.6\par Serum electrolytes normal\par Serum glucose 102\par Serum calcium 9.5\par Total bilirubin 2.1\par AST 25 ALT minimally elevated 56\par Alkaline phosphatase 89 normal range noted to 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

## 2020-01-13 NOTE — DISCUSSION/SUMMARY
[FreeTextEntry1] : Post Viral  bronchitis syndrome\par Can add over-the-counter Mucinex DM 1 tablet p.o. twice daily as needed\par Trial  with sample Stiolto 2 puffs QD\par Will notify if no improvement or any deterioration in respiratory status and be reevaluated on a acute urgent basis as necessary\par Data review patient had an emergency room visit at [facility] January 15, 2018\par CT chest abdomen pelvis was completed\par Chest findings noted no evidence of mediastinal mass lymphadenopathy\par Reported multiple tiny peripheral noncalcified nodules 3 mm largest at lower lobes with no dominant nodule or mass\par \par  history of active sleep apnea fatty liver , disease hypertension obstructive sleep apnea hairy cell leukemia coronary artery disease\par \par \par Patient and wife requested an additional cardiologist for possible follow-up opinion\par Provided Carroll Alonso MD of [facility] cardiology

## 2020-01-13 NOTE — REVIEW OF SYSTEMS
[Nasal Congestion] : nasal congestion [Sinus Problems] : sinus problems [Cough] : cough [Sputum] : sputum  [Wheezing] : wheezing [As Noted in HPI] : as noted in HPI [Dizziness] : dizziness [Negative] : Pulmonary Hypertension [Dry Eyes] : no dryness of the eyes [Eye Irritation] : no ~T irritation of the eyes [Epistaxis] : no nosebleeds [Ear Disturbance] : no ear disturbance [Postnasal Drip] : no postnasal drip [Sore Throat] : no sore throat [Dry Mouth] : no dry mouth [Dyspnea] : no dyspnea [Mouth Ulcers] : no mouth ulcers [Hemoptysis] : no hemoptysis [Chest Tightness] : no chest tightness [Pleuritic Pain] : no pleuritic pain [Frequent URIs] : no frequent upper respiratory infections [FreeTextEntry9] : HTN  ASHD with Hx PTCA stent [de-identified] : Hairy Cell Leukemia stablke exam Barber Soto MD Heme [FreeTextEntry7] : colonic polyps

## 2020-01-14 LAB
BASOPHILS # BLD AUTO: 0.03 K/UL
BASOPHILS NFR BLD AUTO: 0.6 %
EOSINOPHIL # BLD AUTO: 0.25 K/UL
EOSINOPHIL NFR BLD AUTO: 5.1 %
HCT VFR BLD CALC: 50.4 %
HGB BLD-MCNC: 17.2 G/DL
IMM GRANULOCYTES NFR BLD AUTO: 0.2 %
LYMPHOCYTES # BLD AUTO: 1.86 K/UL
LYMPHOCYTES NFR BLD AUTO: 38.2 %
MAN DIFF?: NORMAL
MCHC RBC-ENTMCNC: 31.6 PG
MCHC RBC-ENTMCNC: 34.1 GM/DL
MCV RBC AUTO: 92.6 FL
MONOCYTES # BLD AUTO: 0.16 K/UL
MONOCYTES NFR BLD AUTO: 3.3 %
NEUTROPHILS # BLD AUTO: 2.56 K/UL
NEUTROPHILS NFR BLD AUTO: 52.6 %
PLATELET # BLD AUTO: 177 K/UL
RAPID RVP RESULT: DETECTED
RBC # BLD: 5.44 M/UL
RBC # FLD: 12.6 %
RV+EV RNA SPEC QL NAA+PROBE: DETECTED
WBC # FLD AUTO: 4.87 K/UL

## 2020-01-27 ENCOUNTER — APPOINTMENT (OUTPATIENT)
Dept: PULMONOLOGY | Facility: CLINIC | Age: 65
End: 2020-01-27
Payer: COMMERCIAL

## 2020-01-27 VITALS
HEART RATE: 60 BPM | DIASTOLIC BLOOD PRESSURE: 85 MMHG | TEMPERATURE: 98.1 F | SYSTOLIC BLOOD PRESSURE: 130 MMHG | OXYGEN SATURATION: 96 %

## 2020-01-27 PROCEDURE — 36415 COLL VENOUS BLD VENIPUNCTURE: CPT

## 2020-01-27 PROCEDURE — 99214 OFFICE O/P EST MOD 30 MIN: CPT | Mod: 25

## 2020-01-27 PROCEDURE — 94060 EVALUATION OF WHEEZING: CPT

## 2020-01-27 PROCEDURE — 71046 X-RAY EXAM CHEST 2 VIEWS: CPT

## 2020-01-27 RX ORDER — PREDNISONE 50 MG/1
50 TABLET ORAL
Qty: 5 | Refills: 0 | Status: DISCONTINUED | COMMUNITY
Start: 2019-12-25

## 2020-01-27 RX ORDER — FLUTICASONE PROPIONATE 50 UG/1
50 SPRAY, METERED NASAL
Qty: 16 | Refills: 0 | Status: ACTIVE | COMMUNITY
Start: 2019-12-25

## 2020-01-28 LAB
BASOPHILS # BLD AUTO: 0.03 K/UL
BASOPHILS NFR BLD AUTO: 0.6 %
EOSINOPHIL # BLD AUTO: 0.24 K/UL
EOSINOPHIL NFR BLD AUTO: 5.1 %
ERYTHROCYTE [SEDIMENTATION RATE] IN BLOOD BY WESTERGREN METHOD: 21 MM/HR
HCT VFR BLD CALC: 50.2 %
HGB BLD-MCNC: 17.4 G/DL
IMM GRANULOCYTES NFR BLD AUTO: 0.2 %
LYMPHOCYTES # BLD AUTO: 2.2 K/UL
LYMPHOCYTES NFR BLD AUTO: 46.5 %
MAN DIFF?: NORMAL
MCHC RBC-ENTMCNC: 32.3 PG
MCHC RBC-ENTMCNC: 34.7 GM/DL
MCV RBC AUTO: 93.1 FL
MONOCYTES # BLD AUTO: 0.15 K/UL
MONOCYTES NFR BLD AUTO: 3.2 %
NEUTROPHILS # BLD AUTO: 2.1 K/UL
NEUTROPHILS NFR BLD AUTO: 44.4 %
PLATELET # BLD AUTO: 199 K/UL
RBC # BLD: 5.39 M/UL
RBC # FLD: 13 %
WBC # FLD AUTO: 4.73 K/UL

## 2020-01-31 LAB — ANA SER IF-ACNC: NEGATIVE

## 2020-01-31 NOTE — REVIEW OF SYSTEMS
[Nasal Congestion] : nasal congestion [Sinus Problems] : sinus problems [Cough] : cough [Sputum] : sputum  [Wheezing] : wheezing [As Noted in HPI] : as noted in HPI [Dizziness] : dizziness [Negative] : Sleep Disorder [Dry Eyes] : no dryness of the eyes [Eye Irritation] : no ~T irritation of the eyes [Ear Disturbance] : no ear disturbance [Epistaxis] : no nosebleeds [Postnasal Drip] : no postnasal drip [Sore Throat] : no sore throat [Dry Mouth] : no dry mouth [Hemoptysis] : no hemoptysis [Mouth Ulcers] : no mouth ulcers [Dyspnea] : no dyspnea [Chest Tightness] : no chest tightness [Pleuritic Pain] : no pleuritic pain [Frequent URIs] : no frequent upper respiratory infections [FreeTextEntry9] : HTN  ASHD with Hx PTCA stent [FreeTextEntry7] : colonic polyps [de-identified] : Hairy Cell Leukemia stablke exam Barber Soto MD Heme

## 2020-01-31 NOTE — PROCEDURE
[FreeTextEntry1] : \par Spirometry January 27, 2020\par Minimal reduction FVC\par Normal flow rates\par No response to bronchodilator at the FEV1\par Sawtooth pattern\par Data demonstrates compared to December 31 interval improvement at the FEV1 from 2.79 to 2.91 L with a relatively unchanged FVC\par \par Chest x-ray PA lateral January 27, 2020\par Normal cardiac size\par Grossly clear lung fields\par No parenchymal infiltrates pleural effusions or dominant pulmonary nodules\par No gross evidence for rib fracture based on the PA lateral view\par No gross interval change compared to chest x-ray of December 31, 2019\par Chest x-ray PA lateral August 21, 2019\par Borderline cardiomegaly\par Grossly clear lung fields without parenchymal infiltrates pleural effusions dominant pulmonary nodules\par Crowding of markings at the right lower lung zone which is unchanged dating back years and stable\par Impression no evidence of pneumonia\par PFT August 21, 2019\par Spirometry normal\par No response to bronchodilator at FEV1\par Lung volumes normal with total lung capacity 87% predicted.\par Diffusion normal 82% of predicted.\par There is no interval decline of flow rates compared to prior studies dating back from April 25, 2018 as well as May 20, 2019\par \par EKG  6/26/2019\par sinus bradycardia\par \par  Colonoscopy up to date\par \par ADDENDUM\par Data review 1/28/20\par CBC White blood count 4.73 hemoglobin 17.4 hematocrit 50.2\par Platelet count 199,000\par 46.5% lymphocytes\par Sedimentation rate 21\par Data review Feb 22/2019\par CBC White blood count 3.75 hemoglobin 16.6 hematocrit 49.0 platelet count 183,000\par TFT panel normal\par Hemoglobin A1c 5.4%\par HCV negative\par Total cholesterol 132\par HDL 49 triglycerides 79 LDL 67\par Vitamin D 38.6\par Serum electrolytes normal\par Serum glucose 102\par Serum calcium 9.5\par Total bilirubin 2.1\par AST 25 ALT minimally elevated 56\par Alkaline phosphatase 89 normal range noted to 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

## 2020-01-31 NOTE — PHYSICAL EXAM
[General Appearance - Well Developed] : well developed [Normal Appearance] : normal appearance [Well Groomed] : well groomed [General Appearance - Well Nourished] : well nourished [No Deformities] : no deformities [General Appearance - In No Acute Distress] : no acute distress [Normal Conjunctiva] : the conjunctiva exhibited no abnormalities [Eyelids - No Xanthelasma] : the eyelids demonstrated no xanthelasmas [Normal Oropharynx] : normal oropharynx [I] : I [Neck Appearance] : the appearance of the neck was normal [Neck Cervical Mass (___cm)] : no neck mass was observed [Jugular Venous Distention Increased] : there was no jugular-venous distention [Thyroid Diffuse Enlargement] : the thyroid was not enlarged [Heart Rate And Rhythm] : heart rate and rhythm were normal [Heart Sounds] : normal S1 and S2 [Murmurs] : no murmurs present [Arterial Pulses Normal] : the arterial pulses were normal [Edema] : no peripheral edema present [Veins - Varicosity Changes] : no varicosital changes were noted in the lower extremities [Respiration, Rhythm And Depth] : normal respiratory rhythm and effort [Exaggerated Use Of Accessory Muscles For Inspiration] : no accessory muscle use [Auscultation Breath Sounds / Voice Sounds] : lungs were clear to auscultation bilaterally [Chest Palpation] : palpation of the chest revealed no abnormalities [Lungs Percussion] : the lungs were normal to percussion [Bowel Sounds] : normal bowel sounds [Abdomen Soft] : soft [Abdomen Tenderness] : non-tender [Abdomen Mass (___ Cm)] : no abdominal mass palpated [Abnormal Walk] : normal gait [Gait - Sufficient For Exercise Testing] : the gait was sufficient for exercise testing [Nail Clubbing] : no clubbing of the fingernails [Cyanosis, Localized] : no localized cyanosis [Petechial Hemorrhages (___cm)] : no petechial hemorrhages [] : no ischemic changes [No Venous Stasis] : no venous stasis [Skin Lesions] : no skin lesions [No Skin Ulcers] : no skin ulcer [No Xanthoma] : no  xanthoma was observed [Deep Tendon Reflexes (DTR)] : deep tendon reflexes were 2+ and symmetric [Sensation] : the sensory exam was normal to light touch and pinprick [Motor Exam] : the motor exam was normal [No Focal Deficits] : no focal deficits [Oriented To Time, Place, And Person] : oriented to person, place, and time [Impaired Insight] : insight and judgment were intact [Affect] : the affect was normal [Mood] : the mood was normal [FreeTextEntry1] : Negative nystagmus

## 2020-01-31 NOTE — DISCUSSION/SUMMARY
[FreeTextEntry1] : Non resolving cough with left chest wall pain-significant component of musculoskeletal pain\par Post Viral  bronchitis syndrome\par Can add over-the-counter Mucinex DM 1 tablet p.o. twice daily as needed\par Trial  with sample Stiolto 2 puffs QD- ? any true  benefit\par Will notify if no improvement or any deterioration in respiratory status and be reevaluated on a acute urgent basis as necessary\par Data review patient had an emergency room visit at [facility] January 15, 2018\par CT chest abdomen pelvis was completed\par Chest findings noted no evidence of mediastinal mass lymphadenopathy\par Reported multiple tiny peripheral noncalcified nodules 3 mm largest at lower lobes with no dominant nodule or mass\par \par  history of active sleep apnea fatty liver , disease hypertension obstructive sleep apnea hairy cell leukemia coronary artery disease\par \par \par Patient and wife requested an additional cardiologist for possible follow-up opinion\par Provided Carroll Alonso MD of [facility] cardiology

## 2020-02-25 ENCOUNTER — APPOINTMENT (OUTPATIENT)
Dept: PULMONOLOGY | Facility: CLINIC | Age: 65
End: 2020-02-25

## 2020-06-11 ENCOUNTER — APPOINTMENT (OUTPATIENT)
Dept: PULMONOLOGY | Facility: CLINIC | Age: 65
End: 2020-06-11

## 2020-07-20 ENCOUNTER — APPOINTMENT (OUTPATIENT)
Dept: PULMONOLOGY | Facility: CLINIC | Age: 65
End: 2020-07-20
Payer: COMMERCIAL

## 2020-07-20 ENCOUNTER — NON-APPOINTMENT (OUTPATIENT)
Age: 65
End: 2020-07-20

## 2020-07-20 VITALS
HEIGHT: 68 IN | OXYGEN SATURATION: 98 % | DIASTOLIC BLOOD PRESSURE: 72 MMHG | TEMPERATURE: 98.5 F | SYSTOLIC BLOOD PRESSURE: 128 MMHG | WEIGHT: 190 LBS | BODY MASS INDEX: 28.79 KG/M2 | HEART RATE: 57 BPM

## 2020-07-20 LAB
ALBUMIN SERPL ELPH-MCNC: 4.5 G/DL
ALBUMIN: 10
ALP BLD-CCNC: 96 U/L
ALT SERPL-CCNC: 47 U/L
ANION GAP SERPL CALC-SCNC: 12 MMOL/L
AST SERPL-CCNC: 20 U/L
BASOPHILS # BLD AUTO: 0.02 K/UL
BASOPHILS NFR BLD AUTO: 0.5 %
BILIRUB SERPL-MCNC: 2.1 MG/DL
BILIRUB UR QL STRIP: NEGATIVE
BUN SERPL-MCNC: 20 MG/DL
CALCIUM SERPL-MCNC: 9.5 MG/DL
CHLORIDE SERPL-SCNC: 105 MMOL/L
CHOLEST SERPL-MCNC: 123 MG/DL
CHOLEST/HDLC SERPL: 2.5 RATIO
CLARITY UR: CLEAR
CO2 SERPL-SCNC: 26 MMOL/L
CREAT SERPL-MCNC: 0.89 MG/DL
CREATININE: 200
EOSINOPHIL # BLD AUTO: 0.09 K/UL
EOSINOPHIL NFR BLD AUTO: 2.1 %
GLUCOSE SERPL-MCNC: 117 MG/DL
GLUCOSE UR-MCNC: NEGATIVE
HCG UR QL: 0.2 EU/DL
HCT VFR BLD CALC: 46.8 %
HDLC SERPL-MCNC: 50 MG/DL
HGB BLD-MCNC: 16.5 G/DL
HGB UR QL STRIP.AUTO: NORMAL
IMM GRANULOCYTES NFR BLD AUTO: 0.2 %
KETONES UR-MCNC: NEGATIVE
LDLC SERPL CALC-MCNC: 49 MG/DL
LDLC SERPL DIRECT ASSAY-MCNC: 63 MG/DL
LEUKOCYTE ESTERASE UR QL STRIP: NEGATIVE
LYMPHOCYTES # BLD AUTO: 1.74 K/UL
LYMPHOCYTES NFR BLD AUTO: 41 %
MAN DIFF?: NORMAL
MCHC RBC-ENTMCNC: 32.1 PG
MCHC RBC-ENTMCNC: 35.3 GM/DL
MCV RBC AUTO: 91.1 FL
MICROALBUMIN/CREAT UR TEST STR-RTO: <30
MONOCYTES # BLD AUTO: 0.11 K/UL
MONOCYTES NFR BLD AUTO: 2.6 %
NEUTROPHILS # BLD AUTO: 2.27 K/UL
NEUTROPHILS NFR BLD AUTO: 53.6 %
NITRITE UR QL STRIP: NEGATIVE
PH UR STRIP: 5.5
PLATELET # BLD AUTO: 189 K/UL
POTASSIUM SERPL-SCNC: 4.3 MMOL/L
PROT SERPL-MCNC: 6.5 G/DL
PROT UR STRIP-MCNC: NEGATIVE
PSA SERPL-MCNC: 0.93 NG/ML
RBC # BLD: 5.14 M/UL
RBC # FLD: 12.3 %
SODIUM SERPL-SCNC: 143 MMOL/L
SP GR UR STRIP: >=1.03
T4 FREE SERPL-MCNC: 1.1 NG/DL
T4 SERPL-MCNC: 6 UG/DL
TRIGL SERPL-MCNC: 120 MG/DL
TSH SERPL-ACNC: 1.47 UIU/ML
WBC # FLD AUTO: 4.24 K/UL

## 2020-07-20 PROCEDURE — 36415 COLL VENOUS BLD VENIPUNCTURE: CPT

## 2020-07-20 PROCEDURE — 82044 UR ALBUMIN SEMIQUANTITATIVE: CPT | Mod: QW

## 2020-07-20 PROCEDURE — 93000 ELECTROCARDIOGRAM COMPLETE: CPT

## 2020-07-20 PROCEDURE — 99214 OFFICE O/P EST MOD 30 MIN: CPT | Mod: 25

## 2020-07-20 PROCEDURE — 81003 URINALYSIS AUTO W/O SCOPE: CPT | Mod: QW

## 2020-07-20 RX ORDER — METAXALONE 800 MG/1
800 TABLET ORAL
Qty: 10 | Refills: 0 | Status: DISCONTINUED | COMMUNITY
Start: 2020-01-27 | End: 2020-07-20

## 2020-07-20 RX ORDER — PREDNISONE 10 MG/1
10 TABLET ORAL
Qty: 30 | Refills: 1 | Status: DISCONTINUED | COMMUNITY
Start: 2020-01-27 | End: 2020-07-20

## 2020-07-20 NOTE — PROCEDURE
[FreeTextEntry1] : EKG July 20, 2020\par Sinus bradycardia\par RSR\par No acute changes\par \par Spirometry January 27, 2020\par Minimal reduction FVC\par Normal flow rates\par No response to bronchodilator at the FEV1\par Sawtooth pattern\par Data demonstrates compared to December 31 interval improvement at the FEV1 from 2.79 to 2.91 L with a relatively unchanged FVC\par \par Chest x-ray PA lateral January 27, 2020\par Normal cardiac size\par Grossly clear lung fields\par No parenchymal infiltrates pleural effusions or dominant pulmonary nodules\par No gross evidence for rib fracture based on the PA lateral view\par No gross interval change compared to chest x-ray of December 31, 2019\par Chest x-ray PA lateral August 21, 2019\par Borderline cardiomegaly\par Grossly clear lung fields without parenchymal infiltrates pleural effusions dominant pulmonary nodules\par Crowding of markings at the right lower lung zone which is unchanged dating back years and stable\par Impression no evidence of pneumonia\par \par PFT August 21, 2019\par Spirometry normal\par No response to bronchodilator at FEV1\par Lung volumes normal with total lung capacity 87% predicted.\par Diffusion normal 82% of predicted.\par There is no interval decline of flow rates compared to prior studies dating back from April 25, 2018 as well as May 20, 2019\par \par  Colonoscopy up to date\par \par Blood Draw\par July 20 2020\par TFT normal \par  PSA normal range\par CBC normal\par Comprehensive metabolic profile normal\par Glucose 117\par Total bilirubin 2.1\par ALT 47 alkaline phosphatase 96 AST 20\par Lipid profile\par Cholesterol 123 HDL 50 LDL 49\par Direct LDL 63\par \par ADDENDUM\par Data review 1/28/20\par CBC White blood count 4.73 hemoglobin 17.4 hematocrit 50.2\par Platelet count 199,000\par 46.5% lymphocytes\par Sedimentation rate 21\par Data review Feb 22/2019\par CBC White blood count 3.75 hemoglobin 16.6 hematocrit 49.0 platelet count 183,000\par TFT panel normal\par Hemoglobin A1c 5.4%\par HCV negative\par Total cholesterol 132\par HDL 49 triglycerides 79 LDL 67\par Vitamin D 38.6\par Serum electrolytes normal\par Serum glucose 102\par Serum calcium 9.5\par Total bilirubin 2.1\par AST 25 ALT minimally elevated 56\par Alkaline phosphatase 89 normal range noted to 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

## 2020-07-20 NOTE — HISTORY OF PRESENT ILLNESS
[Never] : never [Obstructive Sleep Apnea] : obstructive sleep apnea [TextBox_4] : cardiology \par request labs\par tired\par compliant with Rx\par   [TextBox_165] : non compliant with CPAP

## 2020-07-20 NOTE — REVIEW OF SYSTEMS
[Nasal Congestion] : nasal congestion [Sinus Problems] : sinus problems [Cough] : cough [Sputum] : sputum  [Wheezing] : wheezing [As Noted in HPI] : as noted in HPI [Dizziness] : dizziness [Negative] : Sleep Disorder [Eye Irritation] : no ~T irritation of the eyes [Dry Eyes] : no dryness of the eyes [Ear Disturbance] : no ear disturbance [Epistaxis] : no nosebleeds [Dry Mouth] : no dry mouth [Sore Throat] : no sore throat [Postnasal Drip] : no postnasal drip [Hemoptysis] : no hemoptysis [Chest Tightness] : no chest tightness [Dyspnea] : no dyspnea [Mouth Ulcers] : no mouth ulcers [FreeTextEntry9] : HTN  ASHD with Hx PTCA stent [Pleuritic Pain] : no pleuritic pain [Frequent URIs] : no frequent upper respiratory infections [FreeTextEntry7] : colonic polyps [de-identified] : Hairy Cell Leukemia stablke exam Barber Soto MD Heme

## 2020-07-20 NOTE — PHYSICAL EXAM
[General Appearance - Well Developed] : well developed [Normal Appearance] : normal appearance [No Deformities] : no deformities [General Appearance - Well Nourished] : well nourished [Well Groomed] : well groomed [General Appearance - In No Acute Distress] : no acute distress [Normal Conjunctiva] : the conjunctiva exhibited no abnormalities [Eyelids - No Xanthelasma] : the eyelids demonstrated no xanthelasmas [Normal Oropharynx] : normal oropharynx [I] : I [Neck Appearance] : the appearance of the neck was normal [Neck Cervical Mass (___cm)] : no neck mass was observed [Jugular Venous Distention Increased] : there was no jugular-venous distention [Thyroid Diffuse Enlargement] : the thyroid was not enlarged [Heart Rate And Rhythm] : heart rate and rhythm were normal [Heart Sounds] : normal S1 and S2 [Murmurs] : no murmurs present [Edema] : no peripheral edema present [Arterial Pulses Normal] : the arterial pulses were normal [Veins - Varicosity Changes] : no varicosital changes were noted in the lower extremities [Respiration, Rhythm And Depth] : normal respiratory rhythm and effort [Exaggerated Use Of Accessory Muscles For Inspiration] : no accessory muscle use [Auscultation Breath Sounds / Voice Sounds] : lungs were clear to auscultation bilaterally [Chest Palpation] : palpation of the chest revealed no abnormalities [Lungs Percussion] : the lungs were normal to percussion [Bowel Sounds] : normal bowel sounds [Abdomen Soft] : soft [Abdomen Tenderness] : non-tender [Abdomen Mass (___ Cm)] : no abdominal mass palpated [Abnormal Walk] : normal gait [Gait - Sufficient For Exercise Testing] : the gait was sufficient for exercise testing [Cyanosis, Localized] : no localized cyanosis [Nail Clubbing] : no clubbing of the fingernails [Petechial Hemorrhages (___cm)] : no petechial hemorrhages [] : no ischemic changes [No Venous Stasis] : no venous stasis [No Skin Ulcers] : no skin ulcer [Skin Lesions] : no skin lesions [No Xanthoma] : no  xanthoma was observed [Deep Tendon Reflexes (DTR)] : deep tendon reflexes were 2+ and symmetric [Sensation] : the sensory exam was normal to light touch and pinprick [Motor Exam] : the motor exam was normal [No Focal Deficits] : no focal deficits [Oriented To Time, Place, And Person] : oriented to person, place, and time [Impaired Insight] : insight and judgment were intact [Affect] : the affect was normal [Mood] : the mood was normal [FreeTextEntry1] : Negative nystagmus

## 2020-07-20 NOTE — DISCUSSION/SUMMARY
[FreeTextEntry1] : \par Data review patient had an emergency room visit at [facility] January 15, 2018\par CT chest abdomen pelvis was completed\par Chest findings noted no evidence of mediastinal mass lymphadenopathy\par Reported multiple tiny peripheral noncalcified nodules 3 mm largest at lower lobes with no dominant nodule or mass\par \par  history of  sleep apnea fatty liver , disease hypertension obstructive sleep apnea hairy cell leukemia coronary artery disease\par \par \par Patient and wife requested an additional cardiologist for possible follow-up opinion- active Dr Montana\par

## 2020-07-21 LAB
SARS-COV-2 IGG SERPL IA-ACNC: <3.8 AU/ML
SARS-COV-2 IGG SERPL QL IA: NEGATIVE

## 2020-10-19 ENCOUNTER — APPOINTMENT (OUTPATIENT)
Dept: PULMONOLOGY | Facility: CLINIC | Age: 65
End: 2020-10-19

## 2020-10-26 ENCOUNTER — TRANSCRIPTION ENCOUNTER (OUTPATIENT)
Age: 65
End: 2020-10-26

## 2020-11-02 ENCOUNTER — APPOINTMENT (OUTPATIENT)
Dept: PULMONOLOGY | Facility: CLINIC | Age: 65
End: 2020-11-02

## 2020-11-03 ENCOUNTER — APPOINTMENT (OUTPATIENT)
Dept: CARDIOLOGY | Facility: CLINIC | Age: 65
End: 2020-11-03
Payer: COMMERCIAL

## 2020-11-03 ENCOUNTER — APPOINTMENT (OUTPATIENT)
Dept: GASTROENTEROLOGY | Facility: CLINIC | Age: 65
End: 2020-11-03

## 2020-11-03 ENCOUNTER — NON-APPOINTMENT (OUTPATIENT)
Age: 65
End: 2020-11-03

## 2020-11-03 VITALS
TEMPERATURE: 96 F | HEART RATE: 63 BPM | DIASTOLIC BLOOD PRESSURE: 82 MMHG | BODY MASS INDEX: 30.11 KG/M2 | WEIGHT: 198 LBS | SYSTOLIC BLOOD PRESSURE: 126 MMHG | OXYGEN SATURATION: 97 %

## 2020-11-03 PROCEDURE — 99203 OFFICE O/P NEW LOW 30 MIN: CPT

## 2020-11-03 PROCEDURE — 99072 ADDL SUPL MATRL&STAF TM PHE: CPT

## 2020-11-03 PROCEDURE — 93000 ELECTROCARDIOGRAM COMPLETE: CPT

## 2020-11-03 NOTE — REASON FOR VISIT
[FreeTextEntry1] : Merrill Adrian 65 years old history of remote stent essential hypertension now over the last few months with vague chest pressure sometimes with exertion

## 2020-11-03 NOTE — HISTORY OF PRESENT ILLNESS
[FreeTextEntry1] : Merrill is an overall healthy 65-year-old with a history of hypertension and remote stent many years ago.  He has a history of hyperlipidemia and a strong family history particularly in his father of coronary artery disease\par \par I have seen him on a few occasions and he has been quite stable without any cardiac complaints\par \par Recently during COVID-19, he has been experiencing exertional fatigue and vague chest pressure and chest tenderness.  There is no associated nausea vomiting diaphoresis or shortness of breath.  He denies palpitations dizziness or syncope\par \par He is on stable medications\par \par EKG performed today within normal limits normal sinus rhythm

## 2020-11-03 NOTE — DISCUSSION/SUMMARY
[FreeTextEntry1] : I have scheduled the patient for noninvasive cardiac work-up to include a 2D echo and exercise nuclear stress test.  I told him to taper the metoprolol to 25 twice a day 2 days prior to the stress test and then to discontinue the day of the test.\par \par I suggested after the stress test if there are no significant abnormalities, increasing his level of exercise weight reduction decreasing his caloric intake.\par \par Overall he appears cardiac stable\par

## 2020-11-03 NOTE — PHYSICAL EXAM
[Normal Conjunctiva] : the conjunctiva exhibited no abnormalities [FreeTextEntry1] : No neck vein distention no carotid bruit [Heart Sounds] : normal S1 and S2 [Murmurs] : no murmurs present [Arterial Pulses Normal] : the arterial pulses were normal [Respiration, Rhythm And Depth] : normal respiratory rhythm and effort [Auscultation Breath Sounds / Voice Sounds] : lungs were clear to auscultation bilaterally [Abdomen Soft] : soft [Abdomen Tenderness] : non-tender [Cyanosis, Localized] : no localized cyanosis [Oriented To Time, Place, And Person] : oriented to person, place, and time [Affect] : the affect was normal [Mood] : the mood was normal

## 2020-11-03 NOTE — ASSESSMENT
[FreeTextEntry1] : Merrill Adrian has a history of prior stent hypertension hyperlipidemia.  He has recently been experiencing some vague chest discomfort with exertion as well as a tenderness in his chest wall.  His EKG is normal, his blood pressure is under good control and there are no other associated symptoms with the chest pressure.  He has not had a noninvasive cardiac evaluation for some time as he has been quite stable

## 2020-11-07 ENCOUNTER — APPOINTMENT (OUTPATIENT)
Dept: CARDIOLOGY | Facility: CLINIC | Age: 65
End: 2020-11-07
Payer: COMMERCIAL

## 2020-11-07 PROCEDURE — 93306 TTE W/DOPPLER COMPLETE: CPT

## 2020-11-07 PROCEDURE — 99072 ADDL SUPL MATRL&STAF TM PHE: CPT

## 2020-11-12 ENCOUNTER — APPOINTMENT (OUTPATIENT)
Dept: CARDIOLOGY | Facility: CLINIC | Age: 65
End: 2020-11-12
Payer: COMMERCIAL

## 2020-11-12 PROCEDURE — 93015 CV STRESS TEST SUPVJ I&R: CPT

## 2020-11-12 PROCEDURE — 99072 ADDL SUPL MATRL&STAF TM PHE: CPT

## 2020-11-12 PROCEDURE — 78452 HT MUSCLE IMAGE SPECT MULT: CPT

## 2020-11-12 PROCEDURE — A9500: CPT

## 2020-12-23 PROBLEM — Z87.09 HISTORY OF ACUTE BRONCHITIS: Status: RESOLVED | Noted: 2019-05-20 | Resolved: 2020-12-23

## 2021-01-21 ENCOUNTER — APPOINTMENT (OUTPATIENT)
Dept: PULMONOLOGY | Facility: CLINIC | Age: 66
End: 2021-01-21
Payer: COMMERCIAL

## 2021-01-21 VITALS
DIASTOLIC BLOOD PRESSURE: 79 MMHG | SYSTOLIC BLOOD PRESSURE: 132 MMHG | OXYGEN SATURATION: 98 % | HEART RATE: 64 BPM | TEMPERATURE: 97.8 F | WEIGHT: 195 LBS | BODY MASS INDEX: 29.55 KG/M2 | HEIGHT: 68 IN

## 2021-01-21 DIAGNOSIS — R42 DIZZINESS AND GIDDINESS: ICD-10-CM

## 2021-01-21 PROCEDURE — 99213 OFFICE O/P EST LOW 20 MIN: CPT | Mod: 25

## 2021-01-21 PROCEDURE — 99072 ADDL SUPL MATRL&STAF TM PHE: CPT

## 2021-01-21 PROCEDURE — 36415 COLL VENOUS BLD VENIPUNCTURE: CPT

## 2021-01-21 NOTE — REVIEW OF SYSTEMS
[Nasal Congestion] : nasal congestion [Sinus Problems] : sinus problems [Cough] : cough [Sputum] : sputum  [Wheezing] : wheezing [As Noted in HPI] : as noted in HPI [Dizziness] : dizziness [Negative] : Sleep Disorder [Dry Eyes] : no dryness of the eyes [Eye Irritation] : no ~T irritation of the eyes [Ear Disturbance] : no ear disturbance [Epistaxis] : no nosebleeds [Postnasal Drip] : no postnasal drip [Sore Throat] : no sore throat [Dry Mouth] : no dry mouth [Mouth Ulcers] : no mouth ulcers [Hemoptysis] : no hemoptysis [Dyspnea] : no dyspnea [Chest Tightness] : no chest tightness [Pleuritic Pain] : no pleuritic pain [Frequent URIs] : no frequent upper respiratory infections [FreeTextEntry9] : HTN  ASHD with Hx PTCA stent [FreeTextEntry7] : colonic polyps [de-identified] : Hairy Cell Leukemia stablke exam Barber Soto MD Heme

## 2021-01-21 NOTE — DISCUSSION/SUMMARY
[FreeTextEntry1] : \par Data review patient had an emergency room visit at [facility] January 15, 2018\par CT chest abdomen pelvis was completed\par Chest findings noted no evidence of mediastinal mass lymphadenopathy\par Reported multiple tiny peripheral noncalcified nodules 3 mm largest at lower lobes with no dominant nodule or mass\par \par  history of  sleep apnea fatty liver , disease hypertension obstructive sleep apnea hairy cell leukemia coronary artery disease\par \par \par Patient and wife requested an additional cardiologist for possible follow-up opinion- active Dr Montana\par Vertigo- antivert\par Neuro consult vertigo PT\par

## 2021-01-21 NOTE — HISTORY OF PRESENT ILLNESS
[Never] : never [Obstructive Sleep Apnea] : obstructive sleep apnea [TextBox_4] : recurrent episode vertigo hx June 2019 with CT and  neg w/u\par \par cardiology  noted\par request labs\par tired\par compliant with Rx\par   [TextBox_165] : non compliant with CPAP

## 2021-01-22 ENCOUNTER — NON-APPOINTMENT (OUTPATIENT)
Age: 66
End: 2021-01-22

## 2021-01-22 LAB
ANION GAP SERPL CALC-SCNC: 8 MMOL/L
BASOPHILS # BLD AUTO: 0.01 K/UL
BASOPHILS NFR BLD AUTO: 0.2 %
BUN SERPL-MCNC: 20 MG/DL
CALCIUM SERPL-MCNC: 9.5 MG/DL
CHLORIDE SERPL-SCNC: 107 MMOL/L
CO2 SERPL-SCNC: 26 MMOL/L
CREAT SERPL-MCNC: 0.93 MG/DL
EOSINOPHIL # BLD AUTO: 0.08 K/UL
EOSINOPHIL NFR BLD AUTO: 1.9 %
GLUCOSE SERPL-MCNC: 100 MG/DL
HCT VFR BLD CALC: 48.6 %
HGB BLD-MCNC: 16.7 G/DL
IMM GRANULOCYTES NFR BLD AUTO: 0.2 %
LYMPHOCYTES # BLD AUTO: 1.83 K/UL
LYMPHOCYTES NFR BLD AUTO: 44 %
MAN DIFF?: NORMAL
MCHC RBC-ENTMCNC: 31.9 PG
MCHC RBC-ENTMCNC: 34.4 GM/DL
MCV RBC AUTO: 92.7 FL
MONOCYTES # BLD AUTO: 0.15 K/UL
MONOCYTES NFR BLD AUTO: 3.6 %
NEUTROPHILS # BLD AUTO: 2.08 K/UL
NEUTROPHILS NFR BLD AUTO: 50.1 %
PLATELET # BLD AUTO: 176 K/UL
POTASSIUM SERPL-SCNC: 4.4 MMOL/L
RBC # BLD: 5.24 M/UL
RBC # FLD: 12.7 %
SODIUM SERPL-SCNC: 142 MMOL/L
WBC # FLD AUTO: 4.16 K/UL

## 2021-03-09 ENCOUNTER — APPOINTMENT (OUTPATIENT)
Dept: GASTROENTEROLOGY | Facility: CLINIC | Age: 66
End: 2021-03-09
Payer: COMMERCIAL

## 2021-03-09 VITALS
DIASTOLIC BLOOD PRESSURE: 80 MMHG | HEART RATE: 78 BPM | BODY MASS INDEX: 29.86 KG/M2 | OXYGEN SATURATION: 97 % | HEIGHT: 68 IN | TEMPERATURE: 98.2 F | WEIGHT: 197 LBS | SYSTOLIC BLOOD PRESSURE: 125 MMHG

## 2021-03-09 DIAGNOSIS — K63.5 POLYP OF COLON: ICD-10-CM

## 2021-03-09 DIAGNOSIS — Z01.812 ENCOUNTER FOR PREPROCEDURAL LABORATORY EXAMINATION: ICD-10-CM

## 2021-03-09 DIAGNOSIS — Z80.0 FAMILY HISTORY OF MALIGNANT NEOPLASM OF DIGESTIVE ORGANS: ICD-10-CM

## 2021-03-09 DIAGNOSIS — Z20.822 ENCOUNTER FOR PREPROCEDURAL LABORATORY EXAMINATION: ICD-10-CM

## 2021-03-09 PROCEDURE — 99215 OFFICE O/P EST HI 40 MIN: CPT

## 2021-03-09 PROCEDURE — 99072 ADDL SUPL MATRL&STAF TM PHE: CPT

## 2021-03-09 RX ORDER — SODIUM SULFATE, POTASSIUM SULFATE, MAGNESIUM SULFATE 17.5; 3.13; 1.6 G/ML; G/ML; G/ML
17.5-3.13-1.6 SOLUTION, CONCENTRATE ORAL
Qty: 1 | Refills: 0 | Status: ACTIVE | COMMUNITY
Start: 2021-03-09 | End: 1900-01-01

## 2021-03-09 NOTE — HISTORY OF PRESENT ILLNESS
[de-identified] : 65 year old man presents for a screening colonoscopy. His previous colonoscopy on 12/5/18 he had a colon polyp removed. He denies rectal bleeding, melena or hematemesis. He has CAD and is s/p coronary stent for which he is on ASa and Plavix. He wass evaluated by pulmonary medicine for recurrent cough. 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

## 2021-03-09 NOTE — ASSESSMENT
[FreeTextEntry1] : Colon polyps- Patient will be scheduled for a screening colonoscopy. Colon cancer and the prep were discussed with the patient.\par CAD and chronic cough. He is referred for pulmonary and cardiology clearance.\par Hairy cell leukemia is in remission.

## 2021-03-16 ENCOUNTER — TRANSCRIPTION ENCOUNTER (OUTPATIENT)
Age: 66
End: 2021-03-16

## 2021-03-17 ENCOUNTER — NON-APPOINTMENT (OUTPATIENT)
Age: 66
End: 2021-03-17

## 2021-03-17 ENCOUNTER — TRANSCRIPTION ENCOUNTER (OUTPATIENT)
Age: 66
End: 2021-03-17

## 2021-06-17 ENCOUNTER — APPOINTMENT (OUTPATIENT)
Dept: CARDIOLOGY | Facility: CLINIC | Age: 66
End: 2021-06-17
Payer: COMMERCIAL

## 2021-06-17 ENCOUNTER — NON-APPOINTMENT (OUTPATIENT)
Age: 66
End: 2021-06-17

## 2021-06-17 VITALS
HEIGHT: 68 IN | SYSTOLIC BLOOD PRESSURE: 139 MMHG | BODY MASS INDEX: 30.01 KG/M2 | DIASTOLIC BLOOD PRESSURE: 85 MMHG | HEART RATE: 76 BPM | WEIGHT: 198 LBS | OXYGEN SATURATION: 96 %

## 2021-06-17 DIAGNOSIS — R42 DIZZINESS AND GIDDINESS: ICD-10-CM

## 2021-06-17 PROCEDURE — 93000 ELECTROCARDIOGRAM COMPLETE: CPT

## 2021-06-17 PROCEDURE — 99072 ADDL SUPL MATRL&STAF TM PHE: CPT

## 2021-06-17 PROCEDURE — 99214 OFFICE O/P EST MOD 30 MIN: CPT

## 2021-06-17 NOTE — PHYSICAL EXAM
[Normal Conjunctiva] : the conjunctiva exhibited no abnormalities [FreeTextEntry1] : No neck vein distention no carotid bruit [Heart Sounds] : normal S1 and S2 [Murmurs] : no murmurs present [Arterial Pulses Normal] : the arterial pulses were normal [Respiration, Rhythm And Depth] : normal respiratory rhythm and effort [Auscultation Breath Sounds / Voice Sounds] : lungs were clear to auscultation bilaterally [Abdomen Soft] : soft [Cyanosis, Localized] : no localized cyanosis [Abdomen Tenderness] : non-tender [Oriented To Time, Place, And Person] : oriented to person, place, and time [Affect] : the affect was normal [Mood] : the mood was normal

## 2021-06-17 NOTE — REASON FOR VISIT
[FreeTextEntry1] : Merrill Adrian 66 years old here for evaluation of his cardiac status.  Recently he has developed significant dizziness which is interfering with his lifestyle

## 2021-06-17 NOTE — DISCUSSION/SUMMARY
[FreeTextEntry1] : 1.  Decrease Toprol from 50 twice daily to Toprol-XL 50 once a day to take at night\par \par 2.  Continue all other medications\par \par 3.  Patient will keep me aware as to his dizziness and if it persists then a long-term Holter monitor would be worthwhile\par \par Teen follow-up me again in 3 months

## 2021-06-17 NOTE — HISTORY OF PRESENT ILLNESS
[FreeTextEntry1] : Merrill is an overall healthy 65-year-old with a history of hypertension and remote stent many years ago.  He has a history of hyperlipidemia and a strong family history particularly in his father of coronary artery disease\par \par I have seen him on a few occasions and he has been quite stable without any cardiac complaints\par \par Recently during COVID-19, he has been experiencing exertional fatigue and vague chest pressure and chest tenderness.  There is no associated nausea vomiting diaphoresis or shortness of breath.  He denies palpitations dizziness or syncope\par \par He is on stable medications\par \par Because of the chest pain he underwent an echocardiogram and subsequent stress test both of which were unremarkable.  His chest pain has pretty much resolved\par \par Over the last several months he has been complaining of dizziness which is interfering with his lifestyle.  He has no palpitations but he feels his heart rate is slow during these periods of dizziness sometimes down to 45 beats a minute\par \par He is on Toprol-XL 50 twice daily\par \par He otherwise has good exercise tolerance and does not feel dizzy with exercise his heart rate get up to 105 during exercise without symptoms\par \par KG today again reveals normal sinus rhythm and is within normal limits

## 2021-06-17 NOTE — ASSESSMENT
[FreeTextEntry1] : Merrill Adrian has a history of prior stent hypertension hyperlipidemia.  He had  been experiencing some vague chest discomfort with exertion as well as a tenderness in his chest wall.  His EKG is normal, his blood pressure is under good control.  Recent noninvasive cardiac evaluation several months ago with an echo and stress test were unremarkable\par \par His main complaint now is that he has this intermittent dizziness which is interfering with his lifestyle.  It is unrelated to his exercise and seems to be associated with a slow heart rate.

## 2021-06-18 ENCOUNTER — APPOINTMENT (OUTPATIENT)
Dept: PULMONOLOGY | Facility: CLINIC | Age: 66
End: 2021-06-18
Payer: COMMERCIAL

## 2021-06-18 VITALS
SYSTOLIC BLOOD PRESSURE: 118 MMHG | HEART RATE: 77 BPM | OXYGEN SATURATION: 96 % | DIASTOLIC BLOOD PRESSURE: 71 MMHG | TEMPERATURE: 98.2 F

## 2021-06-18 DIAGNOSIS — R10.9 UNSPECIFIED ABDOMINAL PAIN: ICD-10-CM

## 2021-06-18 DIAGNOSIS — M62.830 MUSCLE SPASM OF BACK: ICD-10-CM

## 2021-06-18 LAB
BILIRUB UR QL STRIP: NORMAL
CLARITY UR: CLEAR
COLLECTION METHOD: NORMAL
GLUCOSE UR-MCNC: NORMAL
HCG UR QL: 0.2 EU/DL
HGB UR QL STRIP.AUTO: NORMAL
KETONES UR-MCNC: NORMAL
LEUKOCYTE ESTERASE UR QL STRIP: NORMAL
NITRITE UR QL STRIP: NORMAL
PH UR STRIP: 5.5
PROT UR STRIP-MCNC: NORMAL
SP GR UR STRIP: >=1.03

## 2021-06-18 PROCEDURE — 36415 COLL VENOUS BLD VENIPUNCTURE: CPT

## 2021-06-18 PROCEDURE — 99213 OFFICE O/P EST LOW 20 MIN: CPT | Mod: 25

## 2021-06-18 PROCEDURE — 81003 URINALYSIS AUTO W/O SCOPE: CPT | Mod: QW

## 2021-06-18 PROCEDURE — 99072 ADDL SUPL MATRL&STAF TM PHE: CPT

## 2021-06-18 NOTE — REVIEW OF SYSTEMS
[Nasal Congestion] : nasal congestion [Sinus Problems] : sinus problems [Cough] : cough [Sputum] : sputum  [Wheezing] : wheezing [As Noted in HPI] : as noted in HPI [Dizziness] : dizziness [Negative] : Sleep Disorder [Dry Eyes] : no dryness of the eyes [Eye Irritation] : no ~T irritation of the eyes [Ear Disturbance] : no ear disturbance [Epistaxis] : no nosebleeds [Postnasal Drip] : no postnasal drip [Sore Throat] : no sore throat [Dry Mouth] : no dry mouth [Mouth Ulcers] : no mouth ulcers [Hemoptysis] : no hemoptysis [Dyspnea] : no dyspnea [Chest Tightness] : no chest tightness [Pleuritic Pain] : no pleuritic pain [Frequent URIs] : no frequent upper respiratory infections [FreeTextEntry9] : HTN  ASHD with Hx PTCA stent [FreeTextEntry7] : colonic polyps [de-identified] : Hairy Cell Leukemia stablke exam Barber Soto MD Heme

## 2021-06-18 NOTE — PROCEDURE
[FreeTextEntry1] : EKG July 20, 2020\par Sinus bradycardia\par RSR\par No acute changes\par \par Spirometry January 27, 2020\par Minimal reduction FVC\par Normal flow rates\par No response to bronchodilator at the FEV1\par Sawtooth pattern\par Data demonstrates compared to December 31 interval improvement at the FEV1 from 2.79 to 2.91 L with a relatively unchanged FVC\par \par Chest x-ray PA lateral January 27, 2020\par Normal cardiac size\par Grossly clear lung fields\par No parenchymal infiltrates pleural effusions or dominant pulmonary nodules\par No gross evidence for rib fracture based on the PA lateral view\par No gross interval change compared to chest x-ray of December 31, 2019\par Chest x-ray PA lateral August 21, 2019\par Borderline cardiomegaly\par Grossly clear lung fields without parenchymal infiltrates pleural effusions dominant pulmonary nodules\par Crowding of markings at the right lower lung zone which is unchanged dating back years and stable\par Impression no evidence of pneumonia\par \par PFT August 21, 2019\par Spirometry normal\par No response to bronchodilator at FEV1\par Lung volumes normal with total lung capacity 87% predicted.\par Diffusion normal 82% of predicted.\par There is no interval decline of flow rates compared to prior studies dating back from April 25, 2018 as well as May 20, 2019\par \par  Colonoscopy up to date\par \par Blood Draw\par \par July 20 2020\par TFT normal \par  PSA normal range\par CBC normal\par Comprehensive metabolic profile normal\par Glucose 117\par Total bilirubin 2.1\par ALT 47 alkaline phosphatase 96 AST 20\par Lipid profile\par Cholesterol 123 HDL 50 LDL 49\par Direct LDL 63\par \par ADDENDUM\par Data review 1/28/20\par CBC White blood count 4.73 hemoglobin 17.4 hematocrit 50.2\par Platelet count 199,000\par 46.5% lymphocytes\par Sedimentation rate 21\par Data review Feb 22/2019\par CBC White blood count 3.75 hemoglobin 16.6 hematocrit 49.0 platelet count 183,000\par TFT panel normal\par Hemoglobin A1c 5.4%\par HCV negative\par Total cholesterol 132\par HDL 49 triglycerides 79 LDL 67\par Vitamin D 38.6\par Serum electrolytes normal\par Serum glucose 102\par Serum calcium 9.5\par Total bilirubin 2.1\par AST 25 ALT minimally elevated 56\par Alkaline phosphatase 89 normal range noted to 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

## 2021-06-18 NOTE — HISTORY OF PRESENT ILLNESS
[Never] : never [Obstructive Sleep Apnea] : obstructive sleep apnea [TextBox_4] : low back type pain with left > right stabbing pains\par  with hx renal colic and pain per patient is  very different\par \par recurrent episode vertigo hx June 2019 with CT and  neg w/u\par \par cardiology  noted\par  history of prior stent hypertension hyperlipidemia. He had been experiencing some vague chest discomfort with exertion as well as a tenderness in his chest wall. His EKG is normal, his blood pressure is under good control. Recent noninvasive cardiac evaluation several months ago with an echo and stress test were unremarkable\par His main complaint now is that he has this intermittent dizziness which is interfering with his lifestyle. It is unrelated to his exercise and seems to be associated with a slow heart rate. \par Discussion/Summary\par 1. Decrease Toprol from 50 twice daily to Toprol-XL 50 once a day to take at night\par 2. Continue all other medications\par 3. Patient will keep me aware as to his dizziness and if it persists then a long-term Holter monitor would be worthwhile\par \par   [TextBox_165] : non compliant with CPAP

## 2021-06-18 NOTE — PHYSICAL EXAM
[General Appearance - Well Developed] : well developed [Normal Appearance] : normal appearance [Well Groomed] : well groomed [General Appearance - Well Nourished] : well nourished [No Deformities] : no deformities [General Appearance - In No Acute Distress] : no acute distress [Normal Conjunctiva] : the conjunctiva exhibited no abnormalities [Eyelids - No Xanthelasma] : the eyelids demonstrated no xanthelasmas [Normal Oropharynx] : normal oropharynx [I] : I [Neck Appearance] : the appearance of the neck was normal [Neck Cervical Mass (___cm)] : no neck mass was observed [Jugular Venous Distention Increased] : there was no jugular-venous distention [Thyroid Diffuse Enlargement] : the thyroid was not enlarged [Heart Rate And Rhythm] : heart rate and rhythm were normal [Heart Sounds] : normal S1 and S2 [Murmurs] : no murmurs present [Arterial Pulses Normal] : the arterial pulses were normal [Edema] : no peripheral edema present [Veins - Varicosity Changes] : no varicosital changes were noted in the lower extremities [Respiration, Rhythm And Depth] : normal respiratory rhythm and effort [Exaggerated Use Of Accessory Muscles For Inspiration] : no accessory muscle use [Auscultation Breath Sounds / Voice Sounds] : lungs were clear to auscultation bilaterally [Chest Palpation] : palpation of the chest revealed no abnormalities [Bowel Sounds] : normal bowel sounds [Lungs Percussion] : the lungs were normal to percussion [Abdomen Soft] : soft [Abdomen Tenderness] : non-tender [Abdomen Mass (___ Cm)] : no abdominal mass palpated [Abnormal Walk] : normal gait [Gait - Sufficient For Exercise Testing] : the gait was sufficient for exercise testing [Nail Clubbing] : no clubbing of the fingernails [Cyanosis, Localized] : no localized cyanosis [] : no ischemic changes [Petechial Hemorrhages (___cm)] : no petechial hemorrhages [No Venous Stasis] : no venous stasis [Skin Lesions] : no skin lesions [No Skin Ulcers] : no skin ulcer [No Xanthoma] : no  xanthoma was observed [Deep Tendon Reflexes (DTR)] : deep tendon reflexes were 2+ and symmetric [Motor Exam] : the motor exam was normal [Sensation] : the sensory exam was normal to light touch and pinprick [No Focal Deficits] : no focal deficits [Oriented To Time, Place, And Person] : oriented to person, place, and time [Impaired Insight] : insight and judgment were intact [Affect] : the affect was normal [Mood] : the mood was normal [FreeTextEntry1] : Negative nystagmus
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
## 2021-06-19 LAB
ANION GAP SERPL CALC-SCNC: 13 MMOL/L
BASOPHILS # BLD AUTO: 0.01 K/UL
BASOPHILS NFR BLD AUTO: 0.3 %
BUN SERPL-MCNC: 27 MG/DL
CALCIUM SERPL-MCNC: 9.5 MG/DL
CHLORIDE SERPL-SCNC: 107 MMOL/L
CO2 SERPL-SCNC: 22 MMOL/L
CREAT SERPL-MCNC: 0.87 MG/DL
EOSINOPHIL # BLD AUTO: 0.07 K/UL
EOSINOPHIL NFR BLD AUTO: 2.1 %
GLUCOSE SERPL-MCNC: 99 MG/DL
HCT VFR BLD CALC: 46.8 %
HGB BLD-MCNC: 16.7 G/DL
IMM GRANULOCYTES NFR BLD AUTO: 0 %
LYMPHOCYTES # BLD AUTO: 1.66 K/UL
LYMPHOCYTES NFR BLD AUTO: 48.7 %
MAGNESIUM SERPL-MCNC: 2.1 MG/DL
MAN DIFF?: NORMAL
MCHC RBC-ENTMCNC: 32.7 PG
MCHC RBC-ENTMCNC: 35.7 GM/DL
MCV RBC AUTO: 91.8 FL
MONOCYTES # BLD AUTO: 0.14 K/UL
MONOCYTES NFR BLD AUTO: 4.1 %
NEUTROPHILS # BLD AUTO: 1.53 K/UL
NEUTROPHILS NFR BLD AUTO: 44.8 %
PHOSPHATE SERPL-MCNC: 3.1 MG/DL
PLATELET # BLD AUTO: 177 K/UL
POTASSIUM SERPL-SCNC: 4.5 MMOL/L
RBC # BLD: 5.1 M/UL
RBC # FLD: 13 %
SODIUM SERPL-SCNC: 143 MMOL/L
WBC # FLD AUTO: 3.41 K/UL

## 2021-07-26 ENCOUNTER — RX RENEWAL (OUTPATIENT)
Age: 66
End: 2021-07-26

## 2021-09-08 RX ORDER — SODIUM PICOSULFATE, MAGNESIUM OXIDE, AND ANHYDROUS CITRIC ACID 10; 3.5; 12 MG/160ML; G/160ML; G/160ML
10-3.5-12 MG-GM LIQUID ORAL
Qty: 1 | Refills: 0 | Status: ACTIVE | COMMUNITY
Start: 2021-09-08 | End: 1900-01-01

## 2021-09-10 ENCOUNTER — LABORATORY RESULT (OUTPATIENT)
Age: 66
End: 2021-09-10

## 2021-09-15 ENCOUNTER — APPOINTMENT (OUTPATIENT)
Dept: GASTROENTEROLOGY | Facility: AMBULATORY MEDICAL SERVICES | Age: 66
End: 2021-09-15
Payer: COMMERCIAL

## 2021-09-15 PROCEDURE — 45385 COLONOSCOPY W/LESION REMOVAL: CPT

## 2021-10-01 ENCOUNTER — APPOINTMENT (OUTPATIENT)
Dept: PULMONOLOGY | Facility: CLINIC | Age: 66
End: 2021-10-01

## 2022-02-24 ENCOUNTER — TRANSCRIPTION ENCOUNTER (OUTPATIENT)
Age: 67
End: 2022-02-24

## 2022-03-31 ENCOUNTER — APPOINTMENT (OUTPATIENT)
Dept: PULMONOLOGY | Facility: CLINIC | Age: 67
End: 2022-03-31
Payer: COMMERCIAL

## 2022-03-31 ENCOUNTER — NON-APPOINTMENT (OUTPATIENT)
Age: 67
End: 2022-03-31

## 2022-03-31 VITALS
TEMPERATURE: 98 F | HEART RATE: 56 BPM | BODY MASS INDEX: 29.35 KG/M2 | OXYGEN SATURATION: 95 % | WEIGHT: 193 LBS | SYSTOLIC BLOOD PRESSURE: 116 MMHG | DIASTOLIC BLOOD PRESSURE: 75 MMHG

## 2022-03-31 DIAGNOSIS — Z11.59 ENCOUNTER FOR SCREENING FOR OTHER VIRAL DISEASES: ICD-10-CM

## 2022-03-31 LAB
ALBUMIN: 30
BILIRUB UR QL STRIP: NEGATIVE
CLARITY UR: CLEAR
COLLECTION METHOD: NORMAL
CREATININE: 300
GLUCOSE UR-MCNC: NEGATIVE
HCG UR QL: 0.2 EU/DL
HGB UR QL STRIP.AUTO: NORMAL
KETONES UR-MCNC: NEGATIVE
LEUKOCYTE ESTERASE UR QL STRIP: NEGATIVE
MICROALBUMIN/CREAT UR TEST STR-RTO: 30
NITRITE UR QL STRIP: NEGATIVE
PH UR STRIP: 5.5
PROT UR STRIP-MCNC: NEGATIVE
SP GR UR STRIP: 1.03

## 2022-03-31 PROCEDURE — 81003 URINALYSIS AUTO W/O SCOPE: CPT | Mod: QW

## 2022-03-31 PROCEDURE — 71046 X-RAY EXAM CHEST 2 VIEWS: CPT

## 2022-03-31 PROCEDURE — 36415 COLL VENOUS BLD VENIPUNCTURE: CPT

## 2022-03-31 PROCEDURE — 99396 PREV VISIT EST AGE 40-64: CPT | Mod: 25

## 2022-03-31 PROCEDURE — 82044 UR ALBUMIN SEMIQUANTITATIVE: CPT | Mod: QW

## 2022-03-31 PROCEDURE — 93000 ELECTROCARDIOGRAM COMPLETE: CPT

## 2022-03-31 NOTE — REVIEW OF SYSTEMS
[Nasal Congestion] : nasal congestion [Sinus Problems] : sinus problems [Cough] : cough [Sputum] : sputum  [Wheezing] : wheezing [As Noted in HPI] : as noted in HPI [Dizziness] : dizziness [Negative] : Sleep Disorder [Dry Eyes] : no dryness of the eyes [Eye Irritation] : no ~T irritation of the eyes [Ear Disturbance] : no ear disturbance [Epistaxis] : no nosebleeds [Postnasal Drip] : no postnasal drip [Sore Throat] : no sore throat [Dry Mouth] : no dry mouth [Mouth Ulcers] : no mouth ulcers [Hemoptysis] : no hemoptysis [Dyspnea] : no dyspnea [Chest Tightness] : no chest tightness [Pleuritic Pain] : no pleuritic pain [Frequent URIs] : no frequent upper respiratory infections [FreeTextEntry9] : HTN  ASHD with Hx PTCA stent [FreeTextEntry7] : colonic polyps [de-identified] : Hairy Cell Leukemia stablke exam Barber Soto MD Heme

## 2022-03-31 NOTE — HISTORY OF PRESENT ILLNESS
[Never] : never [Obstructive Sleep Apnea] : obstructive sleep apnea [TextBox_4] : post  visit  HEME Barber Soto MD [facility]  some low abnls  with f/u pending\par \par recurrent episode vertigo hx June 2019 with CT and  neg w/u\par \par cardiology  noted\par  history of prior stent hypertension hyperlipidemia. He had been experiencing some vague chest discomfort with exertion as well as a tenderness in his chest wall. His EKG is normal, his blood pressure is under good control. Recent noninvasive cardiac evaluation several months ago with an echo and stress test were unremarkable\par His main complaint now is that he has this intermittent dizziness which is interfering with his lifestyle. It is unrelated to his exercise and seems to be associated with a slow heart rate. \par Discussion/Summary\par 1. Decrease Toprol from 50 twice daily to Toprol-XL 50 once a day to take at night\par 2. Continue all other medications\par 3. Patient will keep me aware as to his dizziness and if it persists then a long-term Holter monitor would be worthwhile\par \par   [TextBox_165] : non compliant with CPAP
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
## 2022-04-01 ENCOUNTER — NON-APPOINTMENT (OUTPATIENT)
Age: 67
End: 2022-04-01

## 2022-04-01 LAB
25(OH)D3 SERPL-MCNC: 41.3 NG/ML
ALBUMIN SERPL ELPH-MCNC: 4.5 G/DL
ALP BLD-CCNC: 94 U/L
ALT SERPL-CCNC: 43 U/L
ANION GAP SERPL CALC-SCNC: 13 MMOL/L
AST SERPL-CCNC: 21 U/L
BASOPHILS # BLD AUTO: 0.02 K/UL
BASOPHILS NFR BLD AUTO: 0.6 %
BILIRUB SERPL-MCNC: 2.1 MG/DL
BUN SERPL-MCNC: 21 MG/DL
CALCIUM SERPL-MCNC: 9.4 MG/DL
CHLORIDE SERPL-SCNC: 105 MMOL/L
CHOLEST SERPL-MCNC: 119 MG/DL
CO2 SERPL-SCNC: 23 MMOL/L
COVID-19 NUCLEOCAPSID  GAM ANTIBODY INTERPRETATION: NEGATIVE
COVID-19 SPIKE DOMAIN ANTIBODY INTERPRETATION: POSITIVE
CREAT SERPL-MCNC: 0.92 MG/DL
EGFR: 93 ML/MIN/1.73M2
EOSINOPHIL # BLD AUTO: 0.07 K/UL
EOSINOPHIL NFR BLD AUTO: 2.1 %
ESTIMATED AVERAGE GLUCOSE: 111 MG/DL
GLUCOSE SERPL-MCNC: 87 MG/DL
HBA1C MFR BLD HPLC: 5.5 %
HCT VFR BLD CALC: 48.3 %
HCV AB SER QL: NONREACTIVE
HCV S/CO RATIO: 0.09 S/CO
HDLC SERPL-MCNC: 48 MG/DL
HGB BLD-MCNC: 16.5 G/DL
IMM GRANULOCYTES NFR BLD AUTO: 0 %
LDLC SERPL CALC-MCNC: 58 MG/DL
LYMPHOCYTES # BLD AUTO: 1.64 K/UL
LYMPHOCYTES NFR BLD AUTO: 48.8 %
MAN DIFF?: NORMAL
MCHC RBC-ENTMCNC: 31.8 PG
MCHC RBC-ENTMCNC: 34.2 GM/DL
MCV RBC AUTO: 93.1 FL
MONOCYTES # BLD AUTO: 0.1 K/UL
MONOCYTES NFR BLD AUTO: 3 %
NEUTROPHILS # BLD AUTO: 1.53 K/UL
NEUTROPHILS NFR BLD AUTO: 45.5 %
NONHDLC SERPL-MCNC: 72 MG/DL
PLATELET # BLD AUTO: 168 K/UL
POTASSIUM SERPL-SCNC: 4.1 MMOL/L
PROT SERPL-MCNC: 6.7 G/DL
PSA SERPL-MCNC: 1.19 NG/ML
RBC # BLD: 5.19 M/UL
RBC # FLD: 12.9 %
SARS-COV-2 AB SERPL IA-ACNC: >250 U/ML
SARS-COV-2 AB SERPL QL IA: 0.08 INDEX
SODIUM SERPL-SCNC: 142 MMOL/L
T4 SERPL-MCNC: 6.9 UG/DL
TRIGL SERPL-MCNC: 67 MG/DL
TSH SERPL-ACNC: 1.84 UIU/ML
WBC # FLD AUTO: 3.36 K/UL

## 2022-04-11 PROBLEM — Z11.59 SCREENING FOR VIRAL DISEASE: Status: ACTIVE | Noted: 2020-07-20

## 2022-04-26 ENCOUNTER — RX RENEWAL (OUTPATIENT)
Age: 67
End: 2022-04-26

## 2022-06-14 ENCOUNTER — APPOINTMENT (OUTPATIENT)
Dept: CARDIOLOGY | Facility: CLINIC | Age: 67
End: 2022-06-14
Payer: COMMERCIAL

## 2022-06-14 VITALS
OXYGEN SATURATION: 96 % | WEIGHT: 185 LBS | HEIGHT: 68 IN | BODY MASS INDEX: 28.04 KG/M2 | SYSTOLIC BLOOD PRESSURE: 120 MMHG | HEART RATE: 61 BPM | DIASTOLIC BLOOD PRESSURE: 62 MMHG

## 2022-06-14 PROCEDURE — 99214 OFFICE O/P EST MOD 30 MIN: CPT

## 2022-06-14 RX ORDER — METOPROLOL SUCCINATE 100 MG/1
100 TABLET, EXTENDED RELEASE ORAL DAILY
Qty: 90 | Refills: 2 | Status: DISCONTINUED | COMMUNITY
Start: 2018-07-17 | End: 2022-06-14

## 2022-06-14 RX ORDER — ASPIRIN ENTERIC COATED TABLETS 81 MG 81 MG/1
81 TABLET, DELAYED RELEASE ORAL DAILY
Qty: 100 | Refills: 0 | Status: ACTIVE | COMMUNITY

## 2022-06-14 NOTE — ASSESSMENT
[FreeTextEntry1] : Merrill Adrian has a history of prior stent hypertension hyperlipidemia.  He had  been experiencing some vague chest discomfort with exertion as well as a tenderness in his chest wall.  His EKG is normal, his blood pressure is under good control.  Previous noninvasive cardiac evaluation several months ago with an echo and stress test were unremarkable\par \par His main complaint now is that he has this intermittent dizziness which is interfering with his lifestyle.  It is unrelated to his exercise and seems to be associated with a slow heart rate.\par \par Since last visit his dizziness has resolved on a decreased dose of Toprol.  He overall feels well and is asymptomatic\par \par 1.  Status post PTCI remote no symptoms of angina or CHF previous noninvasive work-up unremarkable\par 2.  Recent dizziness has resolved on a decreased dose of Toprol because of sinus bradycardia.\par 3.  History of hairy cell leukemia

## 2022-06-14 NOTE — REASON FOR VISIT
[FreeTextEntry1] : Merrill Adrian 66 years old here for evaluation of his cardiac status.  On last visit he was complaining of dizziness.  He now comes for follow-up

## 2022-06-14 NOTE — DISCUSSION/SUMMARY
[FreeTextEntry1] : Patient will continue on his present regimen of medications.  I renewed all his medications.  Routine follow with me again in 6 months unless is a change in his status\par \par I encouraged him to continue to exercise and be active without limitation

## 2022-06-14 NOTE — HISTORY OF PRESENT ILLNESS
[FreeTextEntry1] : Merrill is an overall healthy 65-year-old with a history of hypertension and remote stent many years ago.  He has a history of hyperlipidemia and a strong family history particularly in his father of coronary artery disease\par \par I have seen him on a few occasions and he has been quite stable without any cardiac complaints\par \par During COVID-19, he has been experiencing exertional fatigue and vague chest pressure and chest tenderness.  There is no associated nausea vomiting diaphoresis or shortness of breath.  He denies palpitations dizziness or syncope\par \par He is on stable medications\par \par Because of the chest pain he underwent an echocardiogram and subsequent stress test both of which were unremarkable.  His chest pain has pretty much resolved\par \par Over the last several months he has been complaining of dizziness which is interfering with his lifestyle.  He has no palpitations but he feels his heart rate is slow during these periods of dizziness sometimes down to 45 beats a minute\par \par He is on Toprol-XL 50 twice daily.  On last visit we decreased this because of the dizziness to Toprol-XL 50 just at night\par \par Since being on the low-dose of Toprol, he feels well.  He has almost no dizziness.  He has good exercise tolerance.  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 Palmer March 2022 within normal limits\par \par \par KG today again reveals normal sinus rhythm and is within normal limits

## 2022-06-23 ENCOUNTER — TRANSCRIPTION ENCOUNTER (OUTPATIENT)
Age: 67
End: 2022-06-23

## 2022-07-29 NOTE — PHYSICAL EXAM
[General Appearance - Well Developed] : well developed [Normal Appearance] : normal appearance [Well Groomed] : well groomed [General Appearance - Well Nourished] : well nourished [No Deformities] : no deformities [Normal Conjunctiva] : the conjunctiva exhibited no abnormalities [Eyelids - No Xanthelasma] : the eyelids demonstrated no xanthelasmas [General Appearance - In No Acute Distress] : no acute distress [Normal Oropharynx] : normal oropharynx [I] : I Patient/Caregiver provided printed discharge information. [Neck Appearance] : the appearance of the neck was normal [Neck Cervical Mass (___cm)] : no neck mass was observed [Jugular Venous Distention Increased] : there was no jugular-venous distention [Thyroid Diffuse Enlargement] : the thyroid was not enlarged [Heart Rate And Rhythm] : heart rate and rhythm were normal [Heart Sounds] : normal S1 and S2 [Murmurs] : no murmurs present [Edema] : no peripheral edema present [Arterial Pulses Normal] : the arterial pulses were normal [Veins - Varicosity Changes] : no varicosital changes were noted in the lower extremities [Exaggerated Use Of Accessory Muscles For Inspiration] : no accessory muscle use [Respiration, Rhythm And Depth] : normal respiratory rhythm and effort [Auscultation Breath Sounds / Voice Sounds] : lungs were clear to auscultation bilaterally [Chest Palpation] : palpation of the chest revealed no abnormalities [Lungs Percussion] : the lungs were normal to percussion [Bowel Sounds] : normal bowel sounds [Abdomen Soft] : soft [Abdomen Tenderness] : non-tender [Abdomen Mass (___ Cm)] : no abdominal mass palpated [Abnormal Walk] : normal gait [Gait - Sufficient For Exercise Testing] : the gait was sufficient for exercise testing [Nail Clubbing] : no clubbing of the fingernails [Cyanosis, Localized] : no localized cyanosis [Petechial Hemorrhages (___cm)] : no petechial hemorrhages [Skin Color & Pigmentation] : normal skin color and pigmentation [] : no rash [No Venous Stasis] : no venous stasis [Skin Lesions] : no skin lesions [No Skin Ulcers] : no skin ulcer [No Xanthoma] : no  xanthoma was observed [Deep Tendon Reflexes (DTR)] : deep tendon reflexes were 2+ and symmetric [Motor Exam] : the motor exam was normal [Sensation] : the sensory exam was normal to light touch and pinprick [No Focal Deficits] : no focal deficits [Oriented To Time, Place, And Person] : oriented to person, place, and time [Impaired Insight] : insight and judgment were intact [Mood] : the mood was normal [Affect] : the affect was normal [FreeTextEntry1] : Negative nystagmus

## 2022-09-06 ENCOUNTER — RX RENEWAL (OUTPATIENT)
Age: 67
End: 2022-09-06

## 2022-09-13 ENCOUNTER — APPOINTMENT (OUTPATIENT)
Dept: CARDIOLOGY | Facility: CLINIC | Age: 67
End: 2022-09-13

## 2022-09-29 ENCOUNTER — APPOINTMENT (OUTPATIENT)
Dept: ORTHOPEDIC SURGERY | Facility: CLINIC | Age: 67
End: 2022-09-29

## 2022-09-29 DIAGNOSIS — M19.019 PRIMARY OSTEOARTHRITIS, UNSPECIFIED SHOULDER: ICD-10-CM

## 2022-09-29 DIAGNOSIS — M75.21 BICIPITAL TENDINITIS, RIGHT SHOULDER: ICD-10-CM

## 2022-09-29 DIAGNOSIS — M75.41 IMPINGEMENT SYNDROME OF RIGHT SHOULDER: ICD-10-CM

## 2022-09-29 PROCEDURE — 99204 OFFICE O/P NEW MOD 45 MIN: CPT

## 2022-09-29 NOTE — DISCUSSION/SUMMARY
[de-identified] : I discussed the underlying pathophysiology of the patient's condition in great detail with them. I went over the patient's x-rays and MRI with them in great detail. \par \par I informed the patient that surgery will be required to provide him with long term symptom relief. I am recommending an arthroscopic debridement, synovectomy, subacromial decompression, biceps release, and distal clavicle excision. Patient understands that he needs to consider surgical intervention given conservative measures are not providing enough relief and due to the results of the MRI. We had a lengthy discussion about the patient's issues, and talked about the benefits and risks of the procedure. The patient understands the most common risks include infection, allergy to the anesthetic and stiffness of the shoulder. The risks are accepted. The patient was given no assurances that all the symptoms will be alleviated. He would need to obtain medical clearance including a COVID-19 test. He will decide who he wants to do the surgery with. \par \par All of their questions were answered. They understand and consent to the plan. \par \par FU after considering surgery.

## 2022-09-29 NOTE — CONSULT LETTER
[Dear  ___] : Dear  [unfilled], [Consult Letter:] : I had the pleasure of evaluating your patient, [unfilled]. [Please see my note below.] : Please see my note below. [Consult Closing:] : Thank you very much for allowing me to participate in the care of this patient.  If you have any questions, please do not hesitate to contact me. [Sincerely,] : Sincerely, [FreeTextEntry2] : HANNAH SMITH  [FreeTextEntry3] : Ahsan Rubalcava M.D.

## 2022-09-29 NOTE — HISTORY OF PRESENT ILLNESS
[de-identified] : 67 year old male presents complaining of R>L shoulder pain. He denies injury. He notes pain when sleeping on the shoulder that wakes him from sleep. He has been under the care of an orthopedist at [facility] and has been treated with multiple cortisone injections in both shoulders. Patient states the injections were given posteriorly. His orthopedist gave him a recent left shoulder injection, but did not want to give another right shoulder injection because he has had 2-3 over the last few years. He was sent for bilateral shoulder MRIs that were done on 5/25/22. Surgical intervention was then recommended for the right shoulder.\par \par Outside x-rays from [facility] dated 3/30/2022 reviewed by me revealed:\par o Right Shoulder : Degenerative arthritis of the AC joint, a slightly downsloping acromion, no significant degenerative arthritis of the glenohumeral joint, no lytic lesions, osteolysis of the distal clavicle.\par o Left Shoulder : Degenerative arthritis of the AC joint, no significant degenerative arthritis of the glenohumeral joint, no lytic lesions. \par \par Right Shoulder MRI obtained at [facility] on 5/25/2022 revealed:\par 1. Mild rotator cuff tendinosis without evidence of tear. Mild subacromial/subdeltoid bursitis.\par 2. Moderate acromioclavicular osteoarthrosis.\par 3. Mild intra-articular long head biceps tendinosis without tear or tenosynovitis.\par \par Left Shoulder MRI obtained at [facility] on 5/25/2022 revealed:\par 1.  Mild rotator cuff tendinosis without evidence of tear. Mild subacromial/subdeltoid bursitis.\par 2.  Moderate-severe intra-articular long head biceps tendinosis with low-grade interstitial tearing at the biceps tendon genu. Additional degenerative fraying of the superior labrum without displaced labral tear.\par 3.  Moderate acromioclavicular osteoarthrosis.\par

## 2022-09-29 NOTE — ADDENDUM
[FreeTextEntry1] : I, Kulwant Perales, acted solely as a scribe for Dr. Ahsan Rubalcava on this date 09/29/2022.\par \par All medical record entries made by the Scribe were at my, Dr. Ahsan Rubalcava, direction and personally dictated by me on 09/29/2022. I have reviewed the chart and agree that the record accurately reflects my personal performance of the history, physical exam, assessment and plan. I have also personally directed, reviewed, and agreed with the chart.

## 2022-09-29 NOTE — PHYSICAL EXAM
[de-identified] : Constitutional\par o Appearance : well-nourished, well developed, alert, in no acute distress \par Head and Face\par o Head :\par ¦ Inspection : atraumatic, normocephalic\par o Face :\par ¦ Inspection : no visible rash or discoloration\par Respiratory\par o Respiratory Effort: breathing unlabored \par Neurologic\par o Sensation : Normal sensation \par Psychiatric\par o Mood and Affect: mood normal, affect appropriate \par Lymphatic\par o Additional Nodes : No palpable lymph nodes present \par \par Cervical Spine\par o Inspection/Palpation :\par ¦ Inspection : alignment midline, normal degree of lordosis present\par ¦ Skin : normal appearance, no masses or tenderness, trachea midline\par ¦ Palpation : musculature is nontender to palpation\par o Range of Motion : arc of motion full in all planes, no crepitance or pain with ROM\par o Tests: Negative Spurling’s test \par \par Right Upper Extremity\par o Right Shoulder :\par ¦ Inspection/Palpation : anterior capsular and AC joint tenderness, no swelling or deformities\par ¦ Range of Motion : pain with full forward flexion, limited internal rotation, pain with external rotation \par ¦ Strength : forward elevation 5/5, internal rotation 5/5, external rotation 5/5, external rotation at 90° of abduction 5/5, supraspinatus 5/5, adduction and abduction 5/5, biceps/triceps 5/5 \par ¦ Stability : no joint instability on provocative testing \par ¦ Tests: Cox positive, Neer test positive, Kev test positive, drop arm test negative, Cole's test positive \par \par Left Upper Extremity\par o Left Shoulder :\par ¦ Inspection/Palpation : mild anterior capsular tenderness, no AC joint tenderness, no swelling or deformities\par ¦ Range of Motion : full and painless in all planes, no crepitance\par ¦ Strength : forward elevation 5/5, internal rotation 5/5, external rotation 5/5, external rotation at 90° of abduction 5/5, supraspinatus 5/5, adduction and abduction 5/5, biceps/triceps 5/5 \par ¦ Stability : no joint instability on provocative testing\par ¦ Tests: Cox negative, Neer test negative, Kev test negative, drop arm test negative\par \par Gait and Station:\par Gait: gait normal, no significant extremity swelling or lymphedema, good proprioception and balance

## 2022-11-01 ENCOUNTER — NON-APPOINTMENT (OUTPATIENT)
Age: 67
End: 2022-11-01

## 2022-11-01 ENCOUNTER — APPOINTMENT (OUTPATIENT)
Dept: PULMONOLOGY | Facility: CLINIC | Age: 67
End: 2022-11-01

## 2022-11-01 ENCOUNTER — TRANSCRIPTION ENCOUNTER (OUTPATIENT)
Age: 67
End: 2022-11-01

## 2022-11-01 PROCEDURE — 99204 OFFICE O/P NEW MOD 45 MIN: CPT | Mod: 95

## 2022-11-01 NOTE — REVIEW OF SYSTEMS
[Fever] : no fever [Chills] : no chills [Cough] : no cough [Chest Discomfort] : no chest discomfort [TextBox_44] : ZHAO on Plavix

## 2022-11-01 NOTE — HISTORY OF PRESENT ILLNESS
[TextBox_4] : 67-year-old patient\par History as reviewed\par No significant history is cardiac disease on Plavix and statin therapy\par Chief complaint started on Saturday Sunday feeling fatigued and tired\par Subsequently developed a sore throat and cough\par Noted nasal congestion of significance\par No chest congestion or significant no wheezing\par No significant fevers\par Taste and smell intact\par No GI symptoms\par No skin rash\par Notes no headache\par Patient did a rapid COVID antigen at home this a.m. which was positive\par O2 saturations 9697%\par Heart rate in the 70s and regular\par \par Patient has received COVID-vaccine 2 dose protocol with 1 booster\par

## 2022-11-01 NOTE — PROCEDURE
[FreeTextEntry1] : Chest x-ray PA lateral March 31, 2021\par Cardiac size normal\par Lung fields are clear\par No parenchymal infiltrates pleural effusions or dominant pulmonary nodules\par Soft tissue bony structures unremarkable\par Angie mediastinum unremarkable\par \par EKG 3/31/22\par  NSST Changes\par \par EKG July 20, 2020\par Sinus bradycardia\par RSR\par No acute changes\par \par Spirometry January 27, 2020\par Minimal reduction FVC\par Normal flow rates\par No response to bronchodilator at the FEV1\par Sawtooth pattern\par Data demonstrates compared to December 31 interval improvement at the FEV1 from 2.79 to 2.91 L with a relatively unchanged FVC\par \par Chest x-ray PA lateral January 27, 2020\par Normal cardiac size\par Grossly clear lung fields\par No parenchymal infiltrates pleural effusions or dominant pulmonary nodules\par No gross evidence for rib fracture based on the PA lateral view\par No gross interval change compared to chest x-ray of December 31, 2019\par Chest x-ray PA lateral August 21, 2019\par Borderline cardiomegaly\par Grossly clear lung fields without parenchymal infiltrates pleural effusions dominant pulmonary nodules\par Crowding of markings at the right lower lung zone which is unchanged dating back years and stable\par Impression no evidence of pneumonia\par \par PFT August 21, 2019\par Spirometry normal\par No response to bronchodilator at FEV1\par Lung volumes normal with total lung capacity 87% predicted.\par Diffusion normal 82% of predicted.\par There is no interval decline of flow rates compared to prior studies dating back from April 25, 2018 as well as May 20, 2019\par \par  Colonoscopy up to date\par \par Blood Draw\par \par
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
## 2022-11-02 ENCOUNTER — NON-APPOINTMENT (OUTPATIENT)
Age: 67
End: 2022-11-02

## 2022-11-03 ENCOUNTER — OUTPATIENT (OUTPATIENT)
Dept: OUTPATIENT SERVICES | Facility: HOSPITAL | Age: 67
LOS: 1 days | End: 2022-11-03

## 2022-11-03 ENCOUNTER — NON-APPOINTMENT (OUTPATIENT)
Age: 67
End: 2022-11-03

## 2022-11-03 ENCOUNTER — APPOINTMENT (OUTPATIENT)
Dept: DISASTER EMERGENCY | Facility: HOSPITAL | Age: 67
End: 2022-11-03

## 2022-11-03 VITALS — HEART RATE: 68 BPM | HEIGHT: 68 IN | WEIGHT: 190.04 LBS | TEMPERATURE: 98 F

## 2022-11-03 VITALS
HEART RATE: 70 BPM | DIASTOLIC BLOOD PRESSURE: 80 MMHG | SYSTOLIC BLOOD PRESSURE: 127 MMHG | RESPIRATION RATE: 18 BRPM | OXYGEN SATURATION: 96 % | TEMPERATURE: 98 F

## 2022-11-03 DIAGNOSIS — U07.1 COVID-19: ICD-10-CM

## 2022-11-03 DIAGNOSIS — Z95.5 PRESENCE OF CORONARY ANGIOPLASTY IMPLANT AND GRAFT: Chronic | ICD-10-CM

## 2022-11-03 RX ORDER — BEBTELOVIMAB 87.5 MG/ML
175 INJECTION, SOLUTION INTRAVENOUS ONCE
Refills: 0 | Status: COMPLETED | OUTPATIENT
Start: 2022-11-03 | End: 2022-11-03

## 2022-11-03 RX ADMIN — BEBTELOVIMAB 175 MILLIGRAM(S): 87.5 INJECTION, SOLUTION INTRAVENOUS at 08:10

## 2022-11-03 NOTE — MONOCLONAL ANTIBODY INFUSION - EXAM
CC: Monoclonal Antibody Infusion/COVID 19 Positive  67y Male w/ PmHx of leukemia in remission (s/p chemotherapy), CAD (s/p stent x1), and recent dx of COVID-19 who presents today for elective Bebtelovimab infusion. Patient has been screened and was deemed to be a candidate by  _____.    Symptoms/Criteria: Fatigue, sore throat, body aches, HAs, cough  Date of Positive COVID PCR: 11/1/22  Date of Symptom Onset: 10/30/22  Risk Profile includes: Hx of leukemia, Age = 67, CAD    Vaccination Status: Pfizer x3      Physical Exam/findings:  T(C): 36.6 (11-03-22 @ 07:52), Max: 36.6 (11-03-22 @ 07:49)  HR: 69 (11-03-22 @ 07:52) (68 - 69)  BP: 143/76 (11-03-22 @ 07:52) (143/76 - 143/76)  RR: 16 (11-03-22 @ 07:52) (16 - 16)  SpO2: 94% (11-03-22 @ 07:52) (94% - 94%)    PE:   Appearance: NAD	  HEENT:   Normal oral mucosa, NC/AT  Lymphatic: No lymphadenopathy  Cardiovascular: Normal S1 S2, No JVD, No edema  Respiratory: Lungs clear to auscultation, no accessory muscle use or intracostal retraction  Gastrointestinal:  BS (+), Soft, non-tender, non-distended  Skin: Warm and dry  Neurologic: Non-focal, CN II- XII grossly intact  Extremities: Normal range of motion CC: Monoclonal Antibody Infusion/COVID 19 Positive  67y Male w/ PmHx of leukemia in remission (s/p chemotherapy), CAD (s/p stent x1), and recent dx of COVID-19 who presents today for elective Bebtelovimab infusion. Patient has been screened and was deemed to be a candidate by Dr. DAGOBERTO Palmer.    Symptoms/Criteria: Fatigue, sore throat, body aches, HAs, cough  Date of Positive COVID PCR: 11/1/22  Date of Symptom Onset: 10/30/22  Risk Profile includes: Hx of leukemia, Age = 67, CAD    Vaccination Status: Pfizer x3      Physical Exam/findings:  T(C): 36.6 (11-03-22 @ 07:52), Max: 36.6 (11-03-22 @ 07:49)  HR: 69 (11-03-22 @ 07:52) (68 - 69)  BP: 143/76 (11-03-22 @ 07:52) (143/76 - 143/76)  RR: 16 (11-03-22 @ 07:52) (16 - 16)  SpO2: 94% (11-03-22 @ 07:52) (94% - 94%)    PE:   Appearance: NAD	  HEENT:   Normal oral mucosa, NC/AT  Lymphatic: No lymphadenopathy  Cardiovascular: Normal S1 S2, No JVD, No edema  Respiratory: Lungs clear to auscultation, no accessory muscle use or intracostal retraction  Gastrointestinal:  BS (+), Soft, non-tender, non-distended  Skin: Warm and dry  Neurologic: Non-focal, CN II- XII grossly intact  Extremities: Normal range of motion

## 2022-11-03 NOTE — MONOCLONAL ANTIBODY INFUSION - ASSESSMENT AND PLAN
ASSESSMENT:  Pt is a 67y Male recently diagnosed with Covid-19 infection who was referred for monoclonal antibody (Bebtelovimab) infusion after testing positive for COVID-19 on 11/1.    PLAN:    I have reviewed the Bebtelovimab Emergency Use Authorization (EUA) and I have provided the patient or patient's caregiver with the following information:    1. The FDA has authorized emergency use of Bebtelovimab, it is not an FDA approved biological product & research is ongoing.  2. The patient or patient's caregiver has the option to accept or refuse administration of Bebtelovimab.  3. The significant known and potential risks and benefits of Bebtelovimab and the extent to which such risks and benefits are unknown.  4. Information on available alternative treatments and the risks/benefits of those alternatives.  5. Patient instructed to self-isolate & use infection control measures (e.g wear mask, isolate, social distance, avoid sharing personal items, clean & disinfect "high touch" surfaces, & frequent handwashing) according to the CDC guidelines.     - Injection procedure explained to patient  - Consent for mAb injection obtained; Patient verbalized understanding of plan and agrees to treatment  - Risk & benefits discussed/all questions answered to the best of my ability  - Bebtelovimab 175mg IVP x1 over ~ 1 minute  - Observe patient for one hour post medication administration  - D/C patient with fact sheet explaining symptoms to be aware of for the next couple of days along with contact information for the 24/7 clinical call center should they experience any symptoms  - Outpatient PCP follow up for further management recommendations

## 2022-11-03 NOTE — MONOCLONAL ANTIBODY INFUSION - HOME MEDICATIONS
Toprol-XL 50 mg oral tablet, extended release , 1 tab(s) orally 2 times a day  Lipitor 20 mg oral tablet , 1 tab(s) orally once a day (at bedtime)  Aspir 81 oral delayed release tablet , 1 tab(s) orally once a day  Plavix 75 mg oral tablet , 1 tab(s) orally once a day

## 2022-11-04 ENCOUNTER — NON-APPOINTMENT (OUTPATIENT)
Age: 67
End: 2022-11-04

## 2022-11-08 ENCOUNTER — TRANSCRIPTION ENCOUNTER (OUTPATIENT)
Age: 67
End: 2022-11-08

## 2022-11-09 ENCOUNTER — APPOINTMENT (OUTPATIENT)
Dept: PULMONOLOGY | Facility: CLINIC | Age: 67
End: 2022-11-09

## 2022-11-09 PROCEDURE — 99213 OFFICE O/P EST LOW 20 MIN: CPT | Mod: 95

## 2022-11-09 NOTE — REVIEW OF SYSTEMS
[Fever] : no fever [Fatigue] : fatigue [Chills] : no chills [Sore Throat] : sore throat [Cough] : no cough [Chest Discomfort] : no chest discomfort [Myalgias] : myalgias [Negative] : Endocrine [TextBox_44] : ZHAO on Plavix

## 2022-11-09 NOTE — HISTORY OF PRESENT ILLNESS
[TextBox_4] : 67-year-old patient\par Patient did complain of IV monoclonal therapy infusion\par Is doing okay saturations 95% but does have some persistent sore throat headache little chest congestion and associated cough\par Nonpregnant\par \par History as reviewed\par No significant history is cardiac disease on Plavix and statin therapy\par Chief complaint started on Saturday Sunday feeling fatigued and tired\par Subsequently developed a sore throat and cough\par Noted nasal congestion of significance\par No chest congestion or significant no wheezing\par No significant fevers\par Taste and smell intact\par No GI symptoms\par No skin rash\par Notes no headache\par Patient did a rapid COVID antigen at home this a.m. which was positive\par O2 saturations 9697%\par Heart rate in the 70s and regular\par \par Patient has received COVID-vaccine 2 dose protocol with 1 booster\par  [Home] : at home, [unfilled] , at the time of the visit. [Medical Office: ([facility])___] : at the medical office located in  [Verbal consent obtained from patient] : the patient, [unfilled]
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
## 2022-11-11 ENCOUNTER — NON-APPOINTMENT (OUTPATIENT)
Age: 67
End: 2022-11-11

## 2022-11-11 ENCOUNTER — EMERGENCY (EMERGENCY)
Facility: HOSPITAL | Age: 67
LOS: 1 days | Discharge: HOME | End: 2022-11-11
Attending: EMERGENCY MEDICINE | Admitting: STUDENT IN AN ORGANIZED HEALTH CARE EDUCATION/TRAINING PROGRAM

## 2022-11-11 VITALS
DIASTOLIC BLOOD PRESSURE: 68 MMHG | SYSTOLIC BLOOD PRESSURE: 139 MMHG | HEART RATE: 71 BPM | OXYGEN SATURATION: 98 % | HEIGHT: 68 IN | RESPIRATION RATE: 16 BRPM | TEMPERATURE: 99 F

## 2022-11-11 DIAGNOSIS — R07.89 OTHER CHEST PAIN: ICD-10-CM

## 2022-11-11 DIAGNOSIS — Z95.5 PRESENCE OF CORONARY ANGIOPLASTY IMPLANT AND GRAFT: Chronic | ICD-10-CM

## 2022-11-11 LAB
ALBUMIN SERPL ELPH-MCNC: 3.8 G/DL — SIGNIFICANT CHANGE UP (ref 3.3–5)
ALP SERPL-CCNC: 87 U/L — SIGNIFICANT CHANGE UP (ref 40–120)
ALT FLD-CCNC: 56 U/L — HIGH (ref 4–41)
ANION GAP SERPL CALC-SCNC: 7 MMOL/L — SIGNIFICANT CHANGE UP (ref 7–14)
APTT BLD: 26.6 SEC — LOW (ref 27–36.3)
AST SERPL-CCNC: 19 U/L — SIGNIFICANT CHANGE UP (ref 4–40)
BASOPHILS # BLD AUTO: 0.01 K/UL — SIGNIFICANT CHANGE UP (ref 0–0.2)
BASOPHILS NFR BLD AUTO: 0.2 % — SIGNIFICANT CHANGE UP (ref 0–2)
BILIRUB SERPL-MCNC: 1.4 MG/DL — HIGH (ref 0.2–1.2)
BUN SERPL-MCNC: 28 MG/DL — HIGH (ref 7–23)
CALCIUM SERPL-MCNC: 9.2 MG/DL — SIGNIFICANT CHANGE UP (ref 8.4–10.5)
CHLORIDE SERPL-SCNC: 109 MMOL/L — HIGH (ref 98–107)
CO2 SERPL-SCNC: 27 MMOL/L — SIGNIFICANT CHANGE UP (ref 22–31)
CREAT SERPL-MCNC: 0.82 MG/DL — SIGNIFICANT CHANGE UP (ref 0.5–1.3)
EGFR: 97 ML/MIN/1.73M2 — SIGNIFICANT CHANGE UP
EOSINOPHIL # BLD AUTO: 0.03 K/UL — SIGNIFICANT CHANGE UP (ref 0–0.5)
EOSINOPHIL NFR BLD AUTO: 0.6 % — SIGNIFICANT CHANGE UP (ref 0–6)
GLUCOSE SERPL-MCNC: 117 MG/DL — HIGH (ref 70–99)
HCT VFR BLD CALC: 42.3 % — SIGNIFICANT CHANGE UP (ref 39–50)
HGB BLD-MCNC: 14.8 G/DL — SIGNIFICANT CHANGE UP (ref 13–17)
IANC: 3.98 K/UL — SIGNIFICANT CHANGE UP (ref 1.8–7.4)
IMM GRANULOCYTES NFR BLD AUTO: 0.2 % — SIGNIFICANT CHANGE UP (ref 0–0.9)
INR BLD: 1.02 RATIO — SIGNIFICANT CHANGE UP (ref 0.88–1.16)
LYMPHOCYTES # BLD AUTO: 1.21 K/UL — SIGNIFICANT CHANGE UP (ref 1–3.3)
LYMPHOCYTES # BLD AUTO: 22.7 % — SIGNIFICANT CHANGE UP (ref 13–44)
MCHC RBC-ENTMCNC: 31.8 PG — SIGNIFICANT CHANGE UP (ref 27–34)
MCHC RBC-ENTMCNC: 35 GM/DL — SIGNIFICANT CHANGE UP (ref 32–36)
MCV RBC AUTO: 90.8 FL — SIGNIFICANT CHANGE UP (ref 80–100)
MONOCYTES # BLD AUTO: 0.09 K/UL — SIGNIFICANT CHANGE UP (ref 0–0.9)
MONOCYTES NFR BLD AUTO: 1.7 % — LOW (ref 2–14)
NEUTROPHILS # BLD AUTO: 3.98 K/UL — SIGNIFICANT CHANGE UP (ref 1.8–7.4)
NEUTROPHILS NFR BLD AUTO: 74.6 % — SIGNIFICANT CHANGE UP (ref 43–77)
NRBC # BLD: 0 /100 WBCS — SIGNIFICANT CHANGE UP (ref 0–0)
NRBC # FLD: 0 K/UL — SIGNIFICANT CHANGE UP (ref 0–0)
PLATELET # BLD AUTO: 201 K/UL — SIGNIFICANT CHANGE UP (ref 150–400)
POTASSIUM SERPL-MCNC: 4 MMOL/L — SIGNIFICANT CHANGE UP (ref 3.5–5.3)
POTASSIUM SERPL-SCNC: 4 MMOL/L — SIGNIFICANT CHANGE UP (ref 3.5–5.3)
PROT SERPL-MCNC: 6.5 G/DL — SIGNIFICANT CHANGE UP (ref 6–8.3)
PROTHROM AB SERPL-ACNC: 11.8 SEC — SIGNIFICANT CHANGE UP (ref 10.5–13.4)
RBC # BLD: 4.66 M/UL — SIGNIFICANT CHANGE UP (ref 4.2–5.8)
RBC # FLD: 12.3 % — SIGNIFICANT CHANGE UP (ref 10.3–14.5)
SODIUM SERPL-SCNC: 143 MMOL/L — SIGNIFICANT CHANGE UP (ref 135–145)
TROPONIN T, HIGH SENSITIVITY RESULT: 6 NG/L — SIGNIFICANT CHANGE UP
TROPONIN T, HIGH SENSITIVITY RESULT: 7 NG/L — SIGNIFICANT CHANGE UP
WBC # BLD: 5.33 K/UL — SIGNIFICANT CHANGE UP (ref 3.8–10.5)
WBC # FLD AUTO: 5.33 K/UL — SIGNIFICANT CHANGE UP (ref 3.8–10.5)

## 2022-11-11 PROCEDURE — 71275 CT ANGIOGRAPHY CHEST: CPT | Mod: 26,MA

## 2022-11-11 PROCEDURE — 71045 X-RAY EXAM CHEST 1 VIEW: CPT | Mod: 26

## 2022-11-11 PROCEDURE — 99285 EMERGENCY DEPT VISIT HI MDM: CPT

## 2022-11-11 NOTE — ED PROVIDER NOTE - OBJECTIVE STATEMENT
67 year old male with PMH CAD s/p stents, remote history of leukemia in remission since 2008, presents with COVID for the past 1.5 weeks. States that symptoms started last week, had MAB infusion 1 week ago and then symptoms returned on Monday. Now endorses dry cough, congestion, malaise. States that he was sent in by his pulmonologist Dr. Palmer to rule out PE. Denies chest pain, shortness of breath, nausea, vomiting, diarrhea, abdominal pain.

## 2022-11-11 NOTE — ED ADULT NURSE NOTE - OBJECTIVE STATEMENT
Patient is alert and oriented x 4. General condition stable. No acute distress noted. Breathing with ease. Noted with intermittent dry cough. No verbal complaints at this time. PMH of leukemia. Peripheral IV 20 GA to right AC. Labs drawn. Safety maintained, will continue to monitor.

## 2022-11-11 NOTE — ED PROVIDER NOTE - PATIENT PORTAL LINK FT
You can access the FollowMyHealth Patient Portal offered by [facility] by registering at the following website: [email or web address]. By joining [employer]s FollowMyHealth portal, you will also be able to view your health information using other applications (apps) compatible with our system.

## 2022-11-11 NOTE — ED PROVIDER NOTE - PROGRESS NOTE DETAILS
pt stable pending rpt trop and cta to be done. Sign out to night team Spoke with cardiologist Dr. villa, discussed case. Knows patient well, states can discharge patient home and will follow up with patient in office next week.

## 2022-11-11 NOTE — ED PROVIDER NOTE - NSICDXPASTMEDICALHX_GEN_ALL_CORE_FT
PAST MEDICAL HISTORY:  CAD in native artery     Gilbert's syndrome     Hairy cell leukemia S/P Chemo    History of appendectomy     History of supraventricular tachycardia     HTN (hypertension)     Ulnar fracture S/P ORIF

## 2022-11-11 NOTE — ED ADULT TRIAGE NOTE - CHIEF COMPLAINT QUOTE
tested + for covid last tues, has since developed cough and chest tightness. Pulmonologist advised pt to come to ER for CT scan to r/o PE. denies sob. respirations even and unlabored. PMHx: cardiac stents on plavix

## 2022-11-11 NOTE — ED PROVIDER NOTE - CLINICAL SUMMARY MEDICAL DECISION MAKING FREE TEXT BOX
Beena Salgado DO PGY-2  67 year old male with PMH CAD s/p stents, remote history of leukemia in remission since 2008, presents with COVID for the past 1.5 weeks. States that symptoms started last week, had MAB infusion 1 week ago and then symptoms returned on Monday. Now endorses dry cough, congestion, malaise. States that he was sent in by his pulmonologist Dr. Palmer to rule out PE. Hemodynamically stable, breathing comfortably, satting 99% on room air. Will eval for PE, pneumonia. Will obtain labs, CTA, and re-evaluate for dispo.

## 2022-11-11 NOTE — ED PROVIDER NOTE - ATTENDING CONTRIBUTION TO CARE
Attending Statement: I have personally seen and examined this patient. I have fully participated in the care of this patient. I have reviewed all pertinent clinical information, including history physical exam, plan and the Resident's note and agree except as noted  67yoM with PMH CAD s/p stents, remote history of leukemia in remission since 2008, presents with COVID for the past 1.5 weeks. States that symptoms started last week, had MAB infusion 1 week ago and then symptoms returned on Monday sent in for r/o PE. pt endorsing cough "some" SOB and congestion.  no fever. no n/v/d no hx of PE  Vital signs noted. nontoxic male. no resp distress no WOB. not hypoxic. no pedal edema. no calf tenderness. normal pulses bilateral feet.  plan ekg, labs, ct angio chest.

## 2022-11-11 NOTE — ED PROVIDER NOTE - NSFOLLOWUPINSTRUCTIONS_ED_ALL_ED_FT
You were seen in the ER for your chest pain.    Please follow up with your cardiologist Dr. Montana next week.     Continue all regular home medications as prescribed.    Follow up with your primary doctor within 3 days.  You were given copies of the labs and imaging results, please take them to your follow up appointments.    Return to the ER for any worsening symptoms or concerns including chest pain, shortness of breath, or any other concerns.

## 2022-11-11 NOTE — ED PROVIDER NOTE - PHYSICAL EXAMINATION
PHYSICAL EXAM:  CONSTITUTIONAL: Well appearing, awake, alert, oriented to person, place, time/situation and in no apparent distress.  HEAD: Atraumatic  EYES: Clear bilaterally, pupils equal, round and reactive to light.  ENMT: Airway patent, Nasal mucosa clear. Mouth with normal mucosa. Uvula is midline.   CARDIAC: Normal rate, regular rhythm. +S1/S2. No murmurs, rubs or gallops.  RESPIRATORY: Breathing unlabored. Breath sounds clear and equal bilaterally. Satting 99% on room air, ambulatory.  ABDOMEN:  Soft, nontender, nondistended. No rebound tenderness or guarding.  NEUROLOGICAL: Alert and oriented, no focal deficits, no motor or sensory deficits.   MSK: No clubbing, cyanosis, or edema. Full range of motion of all extremities.  SKIN: Skin warm and dry. No evidence of rashes or lesions.

## 2022-11-14 ENCOUNTER — NON-APPOINTMENT (OUTPATIENT)
Age: 67
End: 2022-11-14

## 2022-11-15 ENCOUNTER — APPOINTMENT (OUTPATIENT)
Dept: CARDIOLOGY | Facility: CLINIC | Age: 67
End: 2022-11-15

## 2022-11-15 ENCOUNTER — NON-APPOINTMENT (OUTPATIENT)
Age: 67
End: 2022-11-15

## 2022-11-15 VITALS
DIASTOLIC BLOOD PRESSURE: 80 MMHG | HEART RATE: 77 BPM | BODY MASS INDEX: 28.74 KG/M2 | SYSTOLIC BLOOD PRESSURE: 151 MMHG | OXYGEN SATURATION: 97 % | WEIGHT: 189 LBS

## 2022-11-15 DIAGNOSIS — Z79.01 LONG TERM (CURRENT) USE OF ANTICOAGULANTS: ICD-10-CM

## 2022-11-15 DIAGNOSIS — Z95.5 PRESENCE OF CORONARY ANGIOPLASTY IMPLANT AND GRAFT: ICD-10-CM

## 2022-11-15 PROCEDURE — 93000 ELECTROCARDIOGRAM COMPLETE: CPT

## 2022-11-15 PROCEDURE — 99214 OFFICE O/P EST MOD 30 MIN: CPT

## 2022-11-16 ENCOUNTER — APPOINTMENT (OUTPATIENT)
Dept: PULMONOLOGY | Facility: CLINIC | Age: 67
End: 2022-11-16

## 2022-11-16 PROCEDURE — 99213 OFFICE O/P EST LOW 20 MIN: CPT | Mod: 95

## 2022-11-16 RX ORDER — COLCHICINE 0.6 MG/1
0.6 TABLET ORAL
Qty: 30 | Refills: 0 | Status: ACTIVE | COMMUNITY
Start: 2022-11-16 | End: 1900-01-01

## 2022-11-16 NOTE — REVIEW OF SYSTEMS
[Fatigue] : fatigue [Nasal Congestion] : nasal congestion [Cough] : cough [Chest Discomfort] : chest discomfort [Dizziness] : dizziness [Negative] : Dermatologic [Watery Eyes] : no watery eyes [Nasal Discharge] : no nasal discharge [Hives] : no hives [Depression] : no depression [Anxiety] : no anxiety [Diabetes] : no diabetes [Thyroid Problem] : no thyroid problem [TextBox_44] : Cardiology noted [TextBox_110] : hx hairy cell leukemia [TextBox_119] : Brain Fog
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
## 2022-11-16 NOTE — HISTORY OF PRESENT ILLNESS
[Home] : at home, [unfilled] , at the time of the visit. [Medical Office: ([facility])___] : at the medical office located in  [Verbal consent obtained from patient] : the patient, [unfilled] [Never] : never [TextBox_4] : Review completed cardiology evaluation\par Will have a scheduled stress test noted\par Cardiology did not feel that there was a significant issue but is still a troponin\par Patient is complaining still feels fatigued tired even some component of brain fog\par No active fevers chills or sweats\par Positive lingering cough\par No purulent sputum\par \par \par Emergency department evaluation Nov 24 2022\par CT chest angiogram protocol\par COVID infection positive\par Negative pulmonary embolism\par Bibasilar dependent atelectasis\par No pleural effusions or pneumothorax\par No reported COVID-pneumonia findings\par Review of emergency department evaluation\par As noted history of coronary artery disease stents\par Remote history of leukemia in remission dating back to 2008\par COVID x1-1/2 weeks\par Did completed MAB infusion with initial COVID symptomatology\par As needed was sent to ER to rule the evaluate and exclude pulmonary embolism as noted above negative\par addendum\par pos Tropoinin they did want to admit patient but he subsequently was discharged home with follow-up cardiology Dr. Montana

## 2022-11-18 NOTE — REASON FOR VISIT
[FreeTextEntry1] : Merrill Adrian Was recently in the [facility] emergency room with shortness of breath to rule out a pulmonary embolus.  He was found to have no evidence of pulmonary embolus but his troponin which was normal on second measured trended down and they wanted to admit him to the hospital.  I spoke to them and told to send him home.  He now comes for follow-up

## 2022-11-18 NOTE — HISTORY OF PRESENT ILLNESS
[FreeTextEntry1] : Merrill is an overall healthy 67-year-old with a history of hypertension and remote stent many years ago.  He has a history of hyperlipidemia and a strong family history particularly in his father of coronary artery disease\par \par I have seen him on a few occasions and he has been quite stable without any cardiac complaints\par \par During COVID-19, he has been experiencing exertional fatigue and vague chest pressure and chest tenderness.  There is no associated nausea vomiting diaphoresis or shortness of breath.  He denies palpitations dizziness or syncope\par \par He is on stable medications\par \par Because of the chest pain he underwent an echocardiogram and subsequent stress test both of which were unremarkable.  His chest pain has pretty much resolved\par \par Over the last several months he has been complaining of dizziness which is interfering with his lifestyle.  He has no palpitations but he feels his heart rate is slow during these periods of dizziness sometimes down to 45 beats a minute\par \par He is on Toprol-XL 50 twice daily.  On last visit we decreased this because of the dizziness to Toprol-XL 50 just at night\par \par  visit November 15, 2022: The patient developed COVID-19 on November 1.  He has been ill with this but did receive intravenous infusion 2 days after he was found positive for COVID-19.  He has been complaining of cough chest pressure headaches.  He was sent to the emergency room because of the persistent cough and shortness of breath where he had a CT to rule out a pulmonary embolus which was negative.  However they did draw a troponin and his troponin initially was 15 and then went to 9.  They were concerned about the change though 15 is a relatively normal high-sensitivity troponin.  His EKG was unremarkable.  I spoke to them in the emergency room and told him to send him home and that we would arrange for follow-up as I did not feel he had an acute ischemic episode\par \par  presently still not feeling well with headache and a cough but is less short of breath still has some intermittent chest pressure usually after coughing\par \par Since being on the low-dose of Toprol, he feels well.  He has almost no dizziness.  He has good exercise tolerance.  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 Palmer March 2022 within normal limits\par \par \par KG today again reveals normal sinus rhythm and is within normal limits

## 2022-11-18 NOTE — DISCUSSION/SUMMARY
[EKG obtained to assist in diagnosis and management of assessed problem(s)] : EKG obtained to assist in diagnosis and management of assessed problem(s) [FreeTextEntry1] :  I tried to reassure him that his cardiac status was stable.  His EKG was normal and physical exam unremarkable.  I did not feel that the troponin that was drawn in the emergency room was of any significance\par \par  nonetheless he is very concerned about this and would like to have a repeat noninvasive cardiac evaluation to include a nuclear stress test which I have ordered.

## 2022-11-18 NOTE — PHYSICAL EXAM
[Normal Conjunctiva] : the conjunctiva exhibited no abnormalities [Heart Sounds] : normal S1 and S2 [Murmurs] : no murmurs present [Arterial Pulses Normal] : the arterial pulses were normal [Respiration, Rhythm And Depth] : normal respiratory rhythm and effort [Auscultation Breath Sounds / Voice Sounds] : lungs were clear to auscultation bilaterally [Abdomen Soft] : soft [Abdomen Tenderness] : non-tender [Cyanosis, Localized] : no localized cyanosis [Oriented To Time, Place, And Person] : oriented to person, place, and time [Affect] : the affect was normal [Mood] : the mood was normal [FreeTextEntry1] : No neck vein distention no carotid bruit

## 2022-11-18 NOTE — ASSESSMENT
[FreeTextEntry1] : Merrill Adrian has a history of prior stent hypertension hyperlipidemia.  He had  been experiencing some vague chest discomfort with exertion as well as a tenderness in his chest wall.  His EKG is normal, his blood pressure is under good control.  Previous noninvasive cardiac evaluation several months ago with an echo and stress test were unremarkable\par \par  he recently contracted COVID-19   With symptoms of headache fatigue cough and chest pressure..  Pulmonary embolus was ruled out.He did have some change in troponin though was relatively normal and he is very concerned about this though I tried to reassure him that those troponin levels were quite normal and his EKG was unremarkable\par \par 1.  Status post PTCI remote no symptoms of angina or CHF previous noninvasive work-up unremarkable\par 2.  Recent dizziness has resolved on a decreased dose of Toprol because of sinus bradycardia.\par 3.  History of hairy cell leukemia\par  4.  Recent COVID-19 with symptoms of cough chest pressure headache and fatigue.  It is slowly getting better but still bothersome to him\par \par  5.  Abnormal blood tests in the emergency room with troponin going from 15-9 which is of concern to the patient but not to me\par

## 2022-11-29 NOTE — ED ADULT NURSE NOTE - CCCP TRG CHIEF CMPLNT
Have Your Skin Lesions Been Treated?: not been treated
Is This A New Presentation, Or A Follow-Up?: Growths
How Severe Is Your Skin Lesion?: mild
chest tightness/cough

## 2022-11-30 ENCOUNTER — APPOINTMENT (OUTPATIENT)
Dept: PULMONOLOGY | Facility: CLINIC | Age: 67
End: 2022-11-30

## 2022-11-30 VITALS — HEART RATE: 75 BPM | OXYGEN SATURATION: 94 % | SYSTOLIC BLOOD PRESSURE: 131 MMHG | DIASTOLIC BLOOD PRESSURE: 76 MMHG

## 2022-11-30 PROCEDURE — 71046 X-RAY EXAM CHEST 2 VIEWS: CPT

## 2022-11-30 PROCEDURE — 94727 GAS DIL/WSHOT DETER LNG VOL: CPT

## 2022-11-30 PROCEDURE — 94729 DIFFUSING CAPACITY: CPT

## 2022-11-30 PROCEDURE — 94618 PULMONARY STRESS TESTING: CPT

## 2022-11-30 PROCEDURE — 94010 BREATHING CAPACITY TEST: CPT

## 2022-11-30 PROCEDURE — 99214 OFFICE O/P EST MOD 30 MIN: CPT | Mod: 25

## 2022-11-30 RX ORDER — AMOXICILLIN 500 MG/1
500 CAPSULE ORAL
Qty: 21 | Refills: 0 | Status: DISCONTINUED | COMMUNITY
Start: 2022-10-28

## 2022-11-30 RX ORDER — COVID-19 MOLECULAR TEST ASSAY
KIT MISCELLANEOUS
Qty: 8 | Refills: 0 | Status: DISCONTINUED | COMMUNITY
Start: 2022-11-02
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
## 2022-12-01 NOTE — REVIEW OF SYSTEMS
[Fatigue] : fatigue [Sore Throat] : sore throat [Myalgias] : myalgias [Negative] : Endocrine [Fever] : no fever [Chills] : no chills [Cough] : no cough [Chest Discomfort] : no chest discomfort [TextBox_44] : ZHAO on Plavix

## 2022-12-01 NOTE — HISTORY OF PRESENT ILLNESS
[TextBox_4] : 67-year-old patient\par Patient did complain of IV monoclonal therapy infusion\par Is doing okay saturations 95% but does have some persistent sore throat headache little chest congestion and associated cough\par Nonpregnant\par \par History as reviewed\par No significant history is cardiac disease on Plavix and statin therapy\par Chief complaint started on Saturday Sunday feeling fatigued and tired\par Subsequently developed a sore throat and cough\par Noted nasal congestion of significance\par No chest congestion or significant no wheezing\par No significant fevers\par Taste and smell intact\par No GI symptoms\par No skin rash\par Notes no headache\par Patient did a rapid COVID antigen at home this a.m. which was positive\par O2 saturations 9697%\par Heart rate in the 70s and regular\par \par Patient has received COVID-vaccine 2 dose protocol with 1 booster\par

## 2022-12-01 NOTE — PHYSICAL EXAM
[No Acute Distress] : no acute distress [No Resp Distress] : no resp distress [Normal Oropharynx] : normal oropharynx [Normal Appearance] : normal appearance [Supple] : supple [No Neck Mass] : no neck mass [No JVD] : no jvd [Normal Rate/Rhythm] : normal rate/rhythm [Normal S1, S2] : normal s1, s2 [No Murmurs] : no murmurs [No Acc Muscle Use] : no acc muscle use [Normal Palpation] : normal palpation [Normal Rhythm and Effort] : normal rhythm and effort [No Abnormalities] : no abnormalities [Benign] : benign [Not Tender] : not tender [Soft] : soft [No HSM] : no hsm [Normal Bowel Sounds] : normal bowel sounds [Normal Gait] : normal gait [No Clubbing] : no clubbing [No Cyanosis] : no cyanosis [No Edema] : no edema [FROM] : FROM

## 2022-12-01 NOTE — PROCEDURE
[FreeTextEntry1] : PFT Nov 30 2022\par Flow  rates nl\par lung volumes nl\par DLCO 83 %\par HGB 14.8\par \par Chest x-ray PA lateral November 30, 2022\par Cardiac size grossly normal\par Post COVID-19 infection\par Lung fields are clear\par No parenchymal infiltrates pleural effusions dominant pulmonary nodules\par Impression\par No evidence for pulmonary parenchymal disease post COVID-19 infection\par \par Pulmonary 6-minute walk exercise study\par November 30, 2022\par Air study\par Baseline O2 saturation 95%\par Impression normal study\par No need air desaturation\par \par \par Chest x-ray PA lateral March 31, 2021\par Cardiac size normal\par Lung fields are clear\par No parenchymal infiltrates pleural effusions or dominant pulmonary nodules\par Soft tissue bony structures unremarkable\par Angie mediastinum unremarkable\par \par EKG 3/31/22\par  NSST Changes\par \par EKG July 20, 2020\par Sinus bradycardia\par RSR\par No acute changes\par \par Spirometry January 27, 2020\par Minimal reduction FVC\par Normal flow rates\par No response to bronchodilator at the FEV1\par Sawtooth pattern\par Data demonstrates compared to December 31 interval improvement at the FEV1 from 2.79 to 2.91 L with a relatively unchanged FVC\par \par Chest x-ray PA lateral January 27, 2020\par Normal cardiac size\par Grossly clear lung fields\par No parenchymal infiltrates pleural effusions or dominant pulmonary nodules\par No gross evidence for rib fracture based on the PA lateral view\par No gross interval change compared to chest x-ray of December 31, 2019\par Chest x-ray PA lateral August 21, 2019\par Borderline cardiomegaly\par Grossly clear lung fields without parenchymal infiltrates pleural effusions dominant pulmonary nodules\par Crowding of markings at the right lower lung zone which is unchanged dating back years and stable\par Impression no evidence of pneumonia\par \par PFT August 21, 2019\par Spirometry normal\par No response to bronchodilator at FEV1\par Lung volumes normal with total lung capacity 87% predicted.\par Diffusion normal 82% of predicted.\par There is no interval decline of flow rates compared to prior studies dating back from April 25, 2018 as well as May 20, 2019\par \par  Colonoscopy up to date\par \par Blood Draw\par \par

## 2022-12-02 ENCOUNTER — APPOINTMENT (OUTPATIENT)
Dept: CARDIOLOGY | Facility: CLINIC | Age: 67
End: 2022-12-02

## 2022-12-02 PROCEDURE — 78452 HT MUSCLE IMAGE SPECT MULT: CPT

## 2022-12-02 PROCEDURE — 93015 CV STRESS TEST SUPVJ I&R: CPT

## 2022-12-02 PROCEDURE — A9500: CPT

## 2023-01-26 ENCOUNTER — APPOINTMENT (OUTPATIENT)
Dept: PULMONOLOGY | Facility: CLINIC | Age: 68
End: 2023-01-26
Payer: COMMERCIAL

## 2023-01-26 VITALS — DIASTOLIC BLOOD PRESSURE: 81 MMHG | OXYGEN SATURATION: 95 % | HEART RATE: 71 BPM | SYSTOLIC BLOOD PRESSURE: 129 MMHG

## 2023-01-26 DIAGNOSIS — R41.89 OTHER SYMPTOMS AND SIGNS INVOLVING COGNITIVE FUNCTIONS AND AWARENESS: ICD-10-CM

## 2023-01-26 DIAGNOSIS — U07.1 COVID-19: ICD-10-CM

## 2023-01-26 LAB
FLUAV SPEC QL CULT: NORMAL
FLUBV AG SPEC QL IA: NORMAL
S PYO AG SPEC QL IA: NORMAL
SARS-COV-2 AG RESP QL IA.RAPID: NEGATIVE

## 2023-01-26 PROCEDURE — 71046 X-RAY EXAM CHEST 2 VIEWS: CPT

## 2023-01-26 PROCEDURE — 99214 OFFICE O/P EST MOD 30 MIN: CPT | Mod: 25

## 2023-01-26 PROCEDURE — 87804 INFLUENZA ASSAY W/OPTIC: CPT | Mod: QW

## 2023-01-26 PROCEDURE — 87880 STREP A ASSAY W/OPTIC: CPT | Mod: QW

## 2023-01-26 PROCEDURE — 36415 COLL VENOUS BLD VENIPUNCTURE: CPT

## 2023-01-26 PROCEDURE — 87811 SARS-COV-2 COVID19 W/OPTIC: CPT
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
## 2023-01-26 NOTE — PHYSICAL EXAM
[No Acute Distress] : no acute distress [Normal Oropharynx] : normal oropharynx [Normal Appearance] : normal appearance [Supple] : supple [No Neck Mass] : no neck mass [No JVD] : no jvd [Normal Rate/Rhythm] : normal rate/rhythm [Normal S1, S2] : normal s1, s2 [No Murmurs] : no murmurs [No Resp Distress] : no resp distress [No Acc Muscle Use] : no acc muscle use [Normal Palpation] : normal palpation [Normal Rhythm and Effort] : normal rhythm and effort [No Abnormalities] : no abnormalities [Benign] : benign [Not Tender] : not tender [Soft] : soft [No HSM] : no hsm [Normal Bowel Sounds] : normal bowel sounds [Normal Gait] : normal gait [No Clubbing] : no clubbing [No Cyanosis] : no cyanosis [No Edema] : no edema [FROM] : FROM

## 2023-01-26 NOTE — PROCEDURE
[FreeTextEntry1] : Chest x-ray PA lateral\par Borderline cardiomegaly\par Lung fields are clear without parenchymal infiltrates pleural effusions with dominant pulmonary nodules\par Soft tissue bony structures unremarkable\par Home mediastinum unremarkable\par Data comparison to chest x-ray November 30, 2022 does not demonstrate any interval change\par Impression clear lungs no evidence for pneumonia\par \par PFT Nov 30 2022\par Flow  rates nl\par lung volumes nl\par DLCO 83 %\par HGB 14.8\par \par Chest x-ray PA lateral November 30, 2022\par Cardiac size grossly normal\par Post COVID-19 infection\par Lung fields are clear\par No parenchymal infiltrates pleural effusions dominant pulmonary nodules\par Impression\par No evidence for pulmonary parenchymal disease post COVID-19 infection\par \par Pulmonary 6-minute walk exercise study\par November 30, 2022\par Air study\par Baseline O2 saturation 95%\par Impression normal study\par No need air desaturation\par \par \par Chest x-ray PA lateral March 31, 2021\par Cardiac size normal\par Lung fields are clear\par No parenchymal infiltrates pleural effusions or dominant pulmonary nodules\par Soft tissue bony structures unremarkable\par Angie mediastinum unremarkable\par \par EKG 3/31/22\par  NSST Changes\par \par EKG July 20, 2020\par Sinus bradycardia\par RSR\par No acute changes\par \par Spirometry January 27, 2020\par Minimal reduction FVC\par Normal flow rates\par No response to bronchodilator at the FEV1\par Sawtooth pattern\par Data demonstrates compared to December 31 interval improvement at the FEV1 from 2.79 to 2.91 L with a relatively unchanged FVC\par \par Chest x-ray PA lateral January 27, 2020\par Normal cardiac size\par Grossly clear lung fields\par No parenchymal infiltrates pleural effusions or dominant pulmonary nodules\par No gross evidence for rib fracture based on the PA lateral view\par No gross interval change compared to chest x-ray of December 31, 2019\par Chest x-ray PA lateral August 21, 2019\par Borderline cardiomegaly\par Grossly clear lung fields without parenchymal infiltrates pleural effusions dominant pulmonary nodules\par Crowding of markings at the right lower lung zone which is unchanged dating back years and stable\par Impression no evidence of pneumonia\par \par PFT August 21, 2019\par Spirometry normal\par No response to bronchodilator at FEV1\par Lung volumes normal with total lung capacity 87% predicted.\par Diffusion normal 82% of predicted.\par There is no interval decline of flow rates compared to prior studies dating back from April 25, 2018 as well as May 20, 2019\par \par  Colonoscopy up to date\par \par Blood Draw\par \par

## 2023-01-27 ENCOUNTER — NON-APPOINTMENT (OUTPATIENT)
Age: 68
End: 2023-01-27

## 2023-01-27 LAB
ANION GAP SERPL CALC-SCNC: 14 MMOL/L
BASOPHILS # BLD AUTO: 0.02 K/UL
BASOPHILS NFR BLD AUTO: 0.5 %
BUN SERPL-MCNC: 22 MG/DL
CALCIUM SERPL-MCNC: 9.5 MG/DL
CHLORIDE SERPL-SCNC: 104 MMOL/L
CO2 SERPL-SCNC: 22 MMOL/L
CREAT SERPL-MCNC: 0.89 MG/DL
EGFR: 95 ML/MIN/1.73M2
EOSINOPHIL # BLD AUTO: 0.07 K/UL
EOSINOPHIL NFR BLD AUTO: 1.7 %
GLUCOSE SERPL-MCNC: 111 MG/DL
HCOV HKU1 RNA SPEC QL NAA+PROBE: DETECTED
HCT VFR BLD CALC: 45.2 %
HGB BLD-MCNC: 16.5 G/DL
IMM GRANULOCYTES NFR BLD AUTO: 0.2 %
LYMPHOCYTES # BLD AUTO: 1.32 K/UL
LYMPHOCYTES NFR BLD AUTO: 31.4 %
MAN DIFF?: NORMAL
MCHC RBC-ENTMCNC: 33 PG
MCHC RBC-ENTMCNC: 36.5 GM/DL
MCV RBC AUTO: 90.4 FL
MONOCYTES # BLD AUTO: 0.09 K/UL
MONOCYTES NFR BLD AUTO: 2.1 %
NEUTROPHILS # BLD AUTO: 2.7 K/UL
NEUTROPHILS NFR BLD AUTO: 64.1 %
PLATELET # BLD AUTO: 167 K/UL
POTASSIUM SERPL-SCNC: 4.2 MMOL/L
PROCALCITONIN SERPL-MCNC: 0.07 NG/ML
RAPID RVP RESULT: DETECTED
RBC # BLD: 5 M/UL
RBC # FLD: 13.2 %
SARS-COV-2 RNA PNL RESP NAA+PROBE: NOT DETECTED
SODIUM SERPL-SCNC: 139 MMOL/L
WBC # FLD AUTO: 4.21 K/UL

## 2023-02-01 ENCOUNTER — APPOINTMENT (OUTPATIENT)
Dept: PULMONOLOGY | Facility: CLINIC | Age: 68
End: 2023-02-01

## 2023-04-17 ENCOUNTER — APPOINTMENT (OUTPATIENT)
Dept: DERMATOLOGY | Facility: CLINIC | Age: 68
End: 2023-04-17
Payer: COMMERCIAL

## 2023-04-17 DIAGNOSIS — L30.9 DERMATITIS, UNSPECIFIED: ICD-10-CM

## 2023-04-17 PROCEDURE — 99204 OFFICE O/P NEW MOD 45 MIN: CPT | Mod: 95

## 2023-04-17 RX ORDER — DESONIDE 0.5 MG/G
0.05 CREAM TOPICAL TWICE DAILY
Qty: 1 | Refills: 0 | Status: ACTIVE | COMMUNITY
Start: 2023-04-17 | End: 1900-01-01

## 2023-05-11 ENCOUNTER — RX RENEWAL (OUTPATIENT)
Age: 68
End: 2023-05-11

## 2023-05-15 ENCOUNTER — EMERGENCY (EMERGENCY)
Facility: HOSPITAL | Age: 68
LOS: 1 days | Discharge: HOME | End: 2023-05-15
Attending: EMERGENCY MEDICINE | Admitting: EMERGENCY MEDICINE
Payer: COMMERCIAL

## 2023-05-15 ENCOUNTER — NON-APPOINTMENT (OUTPATIENT)
Age: 68
End: 2023-05-15

## 2023-05-15 VITALS
OXYGEN SATURATION: 98 % | RESPIRATION RATE: 18 BRPM | SYSTOLIC BLOOD PRESSURE: 148 MMHG | TEMPERATURE: 98 F | DIASTOLIC BLOOD PRESSURE: 78 MMHG | HEART RATE: 100 BPM

## 2023-05-15 VITALS
OXYGEN SATURATION: 100 % | RESPIRATION RATE: 16 BRPM | TEMPERATURE: 98 F | HEART RATE: 60 BPM | SYSTOLIC BLOOD PRESSURE: 161 MMHG | DIASTOLIC BLOOD PRESSURE: 77 MMHG

## 2023-05-15 DIAGNOSIS — Z95.5 PRESENCE OF CORONARY ANGIOPLASTY IMPLANT AND GRAFT: Chronic | ICD-10-CM

## 2023-05-15 LAB
ALBUMIN SERPL ELPH-MCNC: 4.5 G/DL — SIGNIFICANT CHANGE UP (ref 3.3–5)
ALP SERPL-CCNC: 99 U/L — SIGNIFICANT CHANGE UP (ref 40–120)
ALT FLD-CCNC: 43 U/L — HIGH (ref 4–41)
ANION GAP SERPL CALC-SCNC: 9 MMOL/L — SIGNIFICANT CHANGE UP (ref 7–14)
APPEARANCE UR: CLEAR — SIGNIFICANT CHANGE UP
AST SERPL-CCNC: 20 U/L — SIGNIFICANT CHANGE UP (ref 4–40)
BACTERIA # UR AUTO: ABNORMAL
BASOPHILS # BLD AUTO: 0.02 K/UL — SIGNIFICANT CHANGE UP (ref 0–0.2)
BASOPHILS NFR BLD AUTO: 0.4 % — SIGNIFICANT CHANGE UP (ref 0–2)
BILIRUB SERPL-MCNC: 2 MG/DL — HIGH (ref 0.2–1.2)
BILIRUB UR-MCNC: NEGATIVE — SIGNIFICANT CHANGE UP
BUN SERPL-MCNC: 20 MG/DL — SIGNIFICANT CHANGE UP (ref 7–23)
CALCIUM SERPL-MCNC: 9.5 MG/DL — SIGNIFICANT CHANGE UP (ref 8.4–10.5)
CHLORIDE SERPL-SCNC: 107 MMOL/L — SIGNIFICANT CHANGE UP (ref 98–107)
CO2 SERPL-SCNC: 26 MMOL/L — SIGNIFICANT CHANGE UP (ref 22–31)
COLOR SPEC: YELLOW — SIGNIFICANT CHANGE UP
CREAT SERPL-MCNC: 0.9 MG/DL — SIGNIFICANT CHANGE UP (ref 0.5–1.3)
DIFF PNL FLD: ABNORMAL
EGFR: 95 ML/MIN/1.73M2 — SIGNIFICANT CHANGE UP
EOSINOPHIL # BLD AUTO: 0.04 K/UL — SIGNIFICANT CHANGE UP (ref 0–0.5)
EOSINOPHIL NFR BLD AUTO: 0.9 % — SIGNIFICANT CHANGE UP (ref 0–6)
EPI CELLS # UR: 1 /HPF — SIGNIFICANT CHANGE UP (ref 0–5)
GLUCOSE SERPL-MCNC: 106 MG/DL — HIGH (ref 70–99)
GLUCOSE UR QL: NEGATIVE — SIGNIFICANT CHANGE UP
HCT VFR BLD CALC: 45.9 % — SIGNIFICANT CHANGE UP (ref 39–50)
HGB BLD-MCNC: 16.1 G/DL — SIGNIFICANT CHANGE UP (ref 13–17)
HYALINE CASTS # UR AUTO: 2 /LPF — SIGNIFICANT CHANGE UP (ref 0–7)
IANC: 3.3 K/UL — SIGNIFICANT CHANGE UP (ref 1.8–7.4)
IMM GRANULOCYTES NFR BLD AUTO: 0.2 % — SIGNIFICANT CHANGE UP (ref 0–0.9)
KETONES UR-MCNC: NEGATIVE — SIGNIFICANT CHANGE UP
LEUKOCYTE ESTERASE UR-ACNC: NEGATIVE — SIGNIFICANT CHANGE UP
LIDOCAIN IGE QN: 20 U/L — SIGNIFICANT CHANGE UP (ref 7–60)
LYMPHOCYTES # BLD AUTO: 1.11 K/UL — SIGNIFICANT CHANGE UP (ref 1–3.3)
LYMPHOCYTES # BLD AUTO: 24.3 % — SIGNIFICANT CHANGE UP (ref 13–44)
MCHC RBC-ENTMCNC: 32.4 PG — SIGNIFICANT CHANGE UP (ref 27–34)
MCHC RBC-ENTMCNC: 35.1 GM/DL — SIGNIFICANT CHANGE UP (ref 32–36)
MCV RBC AUTO: 92.4 FL — SIGNIFICANT CHANGE UP (ref 80–100)
MONOCYTES # BLD AUTO: 0.08 K/UL — SIGNIFICANT CHANGE UP (ref 0–0.9)
MONOCYTES NFR BLD AUTO: 1.8 % — LOW (ref 2–14)
NEUTROPHILS # BLD AUTO: 3.3 K/UL — SIGNIFICANT CHANGE UP (ref 1.8–7.4)
NEUTROPHILS NFR BLD AUTO: 72.4 % — SIGNIFICANT CHANGE UP (ref 43–77)
NITRITE UR-MCNC: NEGATIVE — SIGNIFICANT CHANGE UP
NRBC # BLD: 0 /100 WBCS — SIGNIFICANT CHANGE UP (ref 0–0)
NRBC # FLD: 0 K/UL — SIGNIFICANT CHANGE UP (ref 0–0)
PH UR: 6 — SIGNIFICANT CHANGE UP (ref 5–8)
PLATELET # BLD AUTO: 162 K/UL — SIGNIFICANT CHANGE UP (ref 150–400)
POTASSIUM SERPL-MCNC: 4.5 MMOL/L — SIGNIFICANT CHANGE UP (ref 3.5–5.3)
POTASSIUM SERPL-SCNC: 4.5 MMOL/L — SIGNIFICANT CHANGE UP (ref 3.5–5.3)
PROT SERPL-MCNC: 6.7 G/DL — SIGNIFICANT CHANGE UP (ref 6–8.3)
PROT UR-MCNC: ABNORMAL
RBC # BLD: 4.97 M/UL — SIGNIFICANT CHANGE UP (ref 4.2–5.8)
RBC # FLD: 12.7 % — SIGNIFICANT CHANGE UP (ref 10.3–14.5)
RBC CASTS # UR COMP ASSIST: 32 /HPF — HIGH (ref 0–4)
SODIUM SERPL-SCNC: 142 MMOL/L — SIGNIFICANT CHANGE UP (ref 135–145)
SP GR SPEC: 1.02 — SIGNIFICANT CHANGE UP (ref 1.01–1.05)
TROPONIN T, HIGH SENSITIVITY RESULT: 8 NG/L — SIGNIFICANT CHANGE UP
UROBILINOGEN FLD QL: SIGNIFICANT CHANGE UP
WBC # BLD: 4.56 K/UL — SIGNIFICANT CHANGE UP (ref 3.8–10.5)
WBC # FLD AUTO: 4.56 K/UL — SIGNIFICANT CHANGE UP (ref 3.8–10.5)
WBC UR QL: 1 /HPF — SIGNIFICANT CHANGE UP (ref 0–5)

## 2023-05-15 PROCEDURE — 99285 EMERGENCY DEPT VISIT HI MDM: CPT

## 2023-05-15 PROCEDURE — 93010 ELECTROCARDIOGRAM REPORT: CPT

## 2023-05-15 PROCEDURE — 74176 CT ABD & PELVIS W/O CONTRAST: CPT | Mod: 26,GC,MA

## 2023-05-15 RX ORDER — MORPHINE SULFATE 50 MG/1
4 CAPSULE, EXTENDED RELEASE ORAL ONCE
Refills: 0 | Status: DISCONTINUED | OUTPATIENT
Start: 2023-05-15 | End: 2023-05-15

## 2023-05-15 RX ORDER — KETOROLAC TROMETHAMINE 30 MG/ML
15 SYRINGE (ML) INJECTION ONCE
Refills: 0 | Status: DISCONTINUED | OUTPATIENT
Start: 2023-05-15 | End: 2023-05-15

## 2023-05-15 RX ORDER — SODIUM CHLORIDE 9 MG/ML
1000 INJECTION INTRAMUSCULAR; INTRAVENOUS; SUBCUTANEOUS ONCE
Refills: 0 | Status: COMPLETED | OUTPATIENT
Start: 2023-05-15 | End: 2023-05-15

## 2023-05-15 RX ADMIN — MORPHINE SULFATE 4 MILLIGRAM(S): 50 CAPSULE, EXTENDED RELEASE ORAL at 18:31

## 2023-05-15 RX ADMIN — Medication 15 MILLIGRAM(S): at 15:57

## 2023-05-15 RX ADMIN — Medication 15 MILLIGRAM(S): at 16:27

## 2023-05-15 RX ADMIN — MORPHINE SULFATE 4 MILLIGRAM(S): 50 CAPSULE, EXTENDED RELEASE ORAL at 15:57

## 2023-05-15 RX ADMIN — SODIUM CHLORIDE 1000 MILLILITER(S): 9 INJECTION INTRAMUSCULAR; INTRAVENOUS; SUBCUTANEOUS at 15:58

## 2023-05-15 RX ADMIN — MORPHINE SULFATE 4 MILLIGRAM(S): 50 CAPSULE, EXTENDED RELEASE ORAL at 16:27

## 2023-05-15 NOTE — ED PROVIDER NOTE - PROGRESS NOTE DETAILS
Mk PGY4: Labs/imaging non actionable. Pain controlled, would like to go home. Will follow up with Uro. Strict return precautions given. Will follow up with PCP.

## 2023-05-15 NOTE — ED PROVIDER NOTE - OBJECTIVE STATEMENT
67 year old male with PMH CAD s/p stents, remote history of leukemia in remission since 2008 presents with left-sided flank pain that started today.  Patient did was doing moderate intensity housework when pain occurred.  States he had 1 episode of soft stool.  Patient does have a history of nephrolithiasis which he says is possibly similar to the pain is experiencing.  Patient states that pain is waxing and waning radiating from the mid abdomen to the left flank. Endorses nausea, no vomiting. No dysuria. Nothing taken for pain. No aggravating/alleviating factors. UTD covid vaccinations, no recent travel, no sick contacts.

## 2023-05-15 NOTE — ED ADULT NURSE NOTE - SUICIDE SCREENING QUESTION 2
"CC: HST results    HPI:  Katherine Calderon is a 29 y.o. 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 who elicited a history to resnoring, witnessed apneas, occasional daytime somnolence, and weight gain. Symptoms have been progressive since 2018 when he began to gain 30 pounds in weight.   The patient briefly describes his sleep problem as \"I snore a lot that my wife has to use earplugs every day to be able to sleep. I have concerns since I was a teenager. My brothers and my dad snore a lot, runs in the family\". He rates the severity of his problem as a 5 out of 10.  His home sleep apnea test was performed on 7/27/2021 and showed severe GINGER with an MARK of 82.1, a manny saturation of 81%, and saturations less than 90% for 15.2% of the time in bed.  He had significant snoring with 752 total snoring episodes and his percentage of snoring was 31.3%.  Significant comorbidities and modifying factors include non-smoker and ringing in the ears.    Patient Active Problem List    Diagnosis Date Noted   • GINGER (obstructive sleep apnea) 06/27/2021   • Non-smoker 06/27/2021       Past Medical History:   Diagnosis Date   • Apnea, sleep    • Ringing in ears    • Snoring    • Wears glasses         History reviewed. No pertinent surgical history.    Family History   Problem Relation Age of Onset   • No Known Problems Mother    • Hypertension Father    • No Known Problems Paternal Grandmother    • Cancer Maternal Aunt        Social History     Socioeconomic History   • Marital status: Single     Spouse name: Not on file   • Number of children: Not on file   • Years of education: Not on file   • Highest education level: Not on file   Occupational History   • Not on file   Tobacco Use   • Smoking status: Never Smoker   • Smokeless tobacco: Never Used   Vaping Use   • Vaping Use: Never used   Substance and Sexual Activity   • Alcohol use: Yes     Comment: Only when theres an occasion, or every " No "other weekend.   • Drug use: Never   • Sexual activity: Yes     Partners: Female     Birth control/protection: Pill     Comment: .    Other Topics Concern   • Not on file   Social History Narrative   • Not on file     Social Determinants of Health     Financial Resource Strain:    • Difficulty of Paying Living Expenses:    Food Insecurity:    • Worried About Running Out of Food in the Last Year:    • Ran Out of Food in the Last Year:    Transportation Needs:    • Lack of Transportation (Medical):    • Lack of Transportation (Non-Medical):    Physical Activity:    • Days of Exercise per Week:    • Minutes of Exercise per Session:    Stress:    • Feeling of Stress :    Social Connections:    • Frequency of Communication with Friends and Family:    • Frequency of Social Gatherings with Friends and Family:    • Attends Zoroastrianism Services:    • Active Member of Clubs or Organizations:    • Attends Club or Organization Meetings:    • Marital Status:    Intimate Partner Violence:    • Fear of Current or Ex-Partner:    • Emotionally Abused:    • Physically Abused:    • Sexually Abused:        No current outpatient medications on file.     No current facility-administered medications for this visit.    \"CURRENT RX\"    ALLERGIES: Patient has no known allergies.    ROS    Constitutional: Denies fever, chills, sweats,  weight loss, fatigue  Cardiovascular: Denies chest pain, tightness, palpitations, swelling in legs/feet, fainting, difficulty breathing when lying down but gets better when sitting up.   Respiratory: Denies shortness of breath, cough, sputum, wheezing, painful breathing, coughing up blood.   Sleep: per HPI  Gastrointestinal: Denies  difficulty swallowing, nausea, abdominal pain, diarrhea, constipation, heartburn.  Musculoskeletal: Denies painful joints, sore muscles, back pain.        PHYSICAL EXAM    /76 (BP Location: Left arm, Patient Position: Sitting, BP Cuff Size: Adult)   Pulse 70   Resp 16  " " Ht 1.727 m (5' 8\")   Wt 81.6 kg (180 lb)   SpO2 96%   BMI 27.37 kg/m²   Appearance: Well-nourished, well-developed, no acute distress  Eyes:  PERRLA, EOMI  ENMT: masked  Neck: Supple, trachea midline, no masses  Respiratory effort:  No intercostal retractions or use of accessory muscles  Lung auscultation:  No wheezes rhonchi rubs or rales  Cardiac: No murmurs, rubs, or gallops; regular rhythm, normal rate; no edema  Abdomen:  No tenderness, no organomegaly.  Musculoskeletal:  Grossly normal; gait and station normal; digits and nails normal  Skin:  No rashes, petechiae, cyanosis  Neurologic: without focal signs; oriented to person, time, place, and purpose; judgement intact  Psychiatric:  No depression, anxiety, agitation      Medical Decision Making       The medical record was reviewed in its entirety including the referral notes, records from primary care, consultants notes, hospital records, lab, imaging, microbiology, immunology, and immunizations.  Care gaps identified and reviewed with the patient.    Diagnostic and titration nocturnal polysomnogram's, home sleep apnea tests, continuous nocturnal oximetry results, multiple sleep latency tests, and compliance reports reviewed.  1. GINGER (obstructive sleep apnea)    2. Non-smoker      PLAN:   His home sleep apnea test was performed on 7/27/2021 and showed severe GINGER with an MARK of 82.1, a manny saturation of 81%, and saturations less than 90% for 15.2% of the time in bed.  He had significant snoring with 752 total snoring episodes and his percentage of snoring was 31.3%.     Will order auto-pap 5-15 cm water using a mask of choice and heated humidification followed by clinical and data chip review in 2-3 months.    The risks of untreated sleep apnea were discussed with the patient at length. Patients with GINGER are at increased risk of cardiovascular disease including coronary artery disease, systemic arterial hypertension, pulmonary arterial hypertension, " cardiac arrythmias, and stroke. GINGER patients have an increased risk of motor vehicle accidents, type 2 diabetes, chronic kidney disease, and non-alcoholic liver disease. The patient was advised to avoid driving a motor vehicle when drowsy.    Positive airway pressure will favorably impact many of the adverse conditions and effects provoked by GINGER.    Have advised the patient to follow up with the appropriate healthcare practitioners for all other medical problems and issues.    Mask wearing, handwashing, and social distancing protocols reviewed and encouraged.    Return in about 10 weeks (around 10/13/2021).    Total time 30 minutes

## 2023-05-15 NOTE — ED PROVIDER NOTE - NSFOLLOWUPINSTRUCTIONS_ED_ALL_ED_FT
Kidney Stones  The urinary tract with a close-up of a kidney showing kidney stones.  Kidney stones are rock-like masses that form inside of the kidneys. Kidneys are organs that make pee (urine). A kidney stone may move into other parts of the urinary tract, including:  The tubes that connect the kidneys to the bladder (ureters).  The bladder.  The tube that carries urine out of the body (urethra).  Kidney stones can cause very bad pain and can block the flow of pee. The stone usually leaves your body (passes) through your pee. You may need to have a doctor take out the stone.    What are the causes?  Kidney stones may be caused by:  A condition in which certain glands make too much parathyroid hormone (primary hyperparathyroidism).  A buildup of a type of crystals in the bladder made of a chemical called uric acid. The body makes uric acid when you eat certain foods.  Narrowing (stricture) of one or both of the ureters.  A kidney blockage that you were born with.  Past surgery on the kidney or the ureters, such as gastric bypass surgery.  What increases the risk?  You are more likely to develop this condition if:  You have had a kidney stone in the past.  You have a family history of kidney stones.  You do not drink enough water.  You eat a diet that is high in protein, salt (sodium), or sugar.  You are overweight or very overweight (obese).  What are the signs or symptoms?  Symptoms of a kidney stone may include:  Pain in the side of the belly, right below the ribs (flank pain). Pain usually spreads (radiates) to the groin.  Needing to pee often or right away (urgently).  Pain when going pee (urinating).  Blood in your pee (hematuria).  Feeling like you may vomit (nauseous).  Vomiting.  Fever and chills.  How is this treated?  Treatment depends on the size, location, and makeup of the kidney stones. The stones will often pass out of the body through peeing. You may need to:  Drink more fluid to help pass the stone. In some cases, you may be given fluids through an IV tube put into one of your veins at the hospital.  Take medicine for pain.  Make changes in your diet to help keep kidney stones from coming back.  Sometimes, medical procedures are needed to remove a kidney stone. This may involve:  A procedure to break up kidney stones using a beam of light (laser) or shock waves.  Surgery to remove the kidney stones.  Follow these instructions at home:  Medicines    Take over-the-counter and prescription medicines only as told by your doctor.  Ask your doctor if the medicine prescribed to you requires you to avoid driving or using heavy machinery.  Eating and drinking    Drink enough fluid to keep your pee pale yellow. You may be told to drink at least 8–10 glasses of water each day. This will help you pass the stone.  If told by your doctor, change your diet. This may include:  Limiting how much salt you eat.  Eating more fruits and vegetables.  Limiting how much meat, poultry, fish, and eggs you eat.  Follow instructions from your doctor about eating or drinking restrictions.  General instructions    Collect pee samples as told by your doctor. You may need to collect a pee sample:  24 hours after a stone comes out.  8–12 weeks after a stone comes out, and every 6–12 months after that.  Strain your pee every time you pee (urinate), for as long as told. Use the strainer that your doctor recommends.  Do not throw out the stone. Keep it so that it can be tested by your doctor.  Keep all follow-up visits as told by your doctor. This is important. You may need follow-up tests.  How is this prevented?  A comparison of three sample cups showing dark yellow, yellow, and pale yellow urine.  To prevent another kidney stone:  Drink enough fluid to keep your pee pale yellow. This is the best way to prevent kidney stones.  Eat healthy foods.  Avoid certain foods as told by your doctor. You may be told to eat less protein.  Stay at a healthy weight.  Where to find more information  National Kidney Foundation (NKF): www.kidney.org  [employer] (UCF): www.urologyhealth.org  Contact a doctor if:  You have pain that gets worse or does not get better with medicine.  Get help right away if:  You have a fever or chills.  You get very bad pain.  You get new pain in your belly (abdomen).  You pass out (faint).  You cannot pee.  Summary  Kidney stones are rock-like masses that form inside of the kidneys.  Kidney stones can cause very bad pain and can block the flow of pee.  The stones will often pass out of the body through peeing.  Drink enough fluid to keep your pee pale yellow.  This information is not intended to replace advice given to you by your health care provider. Make sure you discuss any questions you have with your health care provider. yes

## 2023-05-15 NOTE — ED ADULT TRIAGE NOTE - CHIEF COMPLAINT QUOTE
p.t with hx leukemia, c/o of sudden onset lt flank pain radiating to front, p.t sweating profusely, c/o of sob as well, denies chest pain, vomited before arrival

## 2023-05-15 NOTE — ED PROVIDER NOTE - ATTENDING CONTRIBUTION TO CARE
xander: Mr. Adrian is a 67-year-old man who comes in because of left flank pain.  He does have a distant history of renal stones he also has a history of leukemia in 2008 CAD with stents and hypertension.  medications include metoprolol.  Allergies to vancomycin and tobramycin.  Denies any fever dysuria urgency.  Pain suddenly started at about 12 noon.    Pt alert and can phonate well  h at/nc  perrl, conj clear, sclera anicteric,  neck supple  abd soft no r/g/t  back no cva tenderness on right, yes on the left  ext no edema no deformities  neueo awake, lucid normal gait moves all extremities with strength  psych normal affect  vs reasonable    defer gu exam to resident    pt with ecg normal sinus rate 66 QTc 415 no ST elevations or depressions    Patient appears to have high likelihood of a renal stone.  Would scan for that.  EKG is reasonable although could still have some atypical pain is not very unlikely with this presentation.  Does have a history of stone previously.    I performed a history and physical exam of the patient and discussed their management with the resident and /or advanced care provider. I reviewed the resident and /or ACP's note and agree with the documented findings and plan of care. My medical decison making and observations are found above.

## 2023-05-15 NOTE — ED PROVIDER NOTE - CLINICAL SUMMARY MEDICAL DECISION MAKING FREE TEXT BOX
Mr. Adrian is a 67-year-old man who comes in because of left flank pain.  He does have a distant history of renal stones he also has a history of leukemia in 2008 CAD with stents and hypertension.  medications include metoprolol.  Allergies to vancomycin and tobramycin.  Denies any fever dysuria urgency.  Pain suddenly started at about 12 noon.    Pt alert and can phonate well  h at/nc  perrl, conj clear, sclera anicteric,  neck supple  abd soft no r/g/t  back no cva tenderness on right, yes on the left  ext no edema no deformities  neueo awake, lucid normal gait moves all extremities with strength  psych normal affect  vs reasonable    defer gu exam to resident    pt with ecg normal sinus rate 66 QTc 415 no ST elevations or depressions    Patient appears to have high likelihood of a renal stone.  Would scan for that.  EKG is reasonable although could still have some atypical pain is not very unlikely with this presentation.  Does have a history of stone previously. xander: Mr. Adrian is a 67-year-old man who comes in because of left flank pain.  He does have a distant history of renal stones he also has a history of leukemia in 2008 CAD with stents and hypertension.  medications include metoprolol.  Allergies to vancomycin and tobramycin.  Denies any fever dysuria urgency.  Pain suddenly started at about 12 noon.    Pt alert and can phonate well  h at/nc  perrl, conj clear, sclera anicteric,  neck supple  abd soft no r/g/t  back no cva tenderness on right, yes on the left  ext no edema no deformities  neueo awake, lucid normal gait moves all extremities with strength  psych normal affect  vs reasonable    defer gu exam to resident    pt with ecg normal sinus rate 66 QTc 415 no ST elevations or depressions    Patient appears to have high likelihood of a renal stone.  Would scan for that.  EKG is reasonable although could still have some atypical pain is not very unlikely with this presentation.  Does have a history of stone previously.

## 2023-05-15 NOTE — ED ADULT NURSE NOTE - OBJECTIVE STATEMENT
Pt arrives to room 3 A&Ox4, ambulatory, on room air c/o left flank pain that radiates to middle back that started this 12 noon. Pt history of stent placement on plavix, hairy cell leukemia (2008), SVT, renal stone, HLD, HTN. Pt noted to have ecchymosis to upper left flank. Pt c/o 10/10 pain to left flank. Pt denies chest pain, HA, SOB, dizziness, blurred vision, N/V/D. Pt placed on cardiac monitor showing NSR. Respirations even and unlabored. Abdomen soft and nontender. Urine yellow clear and no odor. Left 18g placed, labs drawn and sent. Urine collected and sent to lab. Pt medicated as ordered. Pt pending CT to r/o renal stone. Safety maintained, call bell within reach.

## 2023-05-15 NOTE — ED PROVIDER NOTE - PATIENT PORTAL LINK FT
You can access the FollowMyHealth Patient Portal offered by [facility] by registering at the following website: [email or web address]. By joining [employer]’s FollowMyHealth portal, you will also be able to view your health information using other applications (apps) compatible with our system.

## 2023-05-15 NOTE — ED PROVIDER NOTE - PHYSICAL EXAMINATION
General: well appearing   HEENT: neck supple, anicteric sclera  Cardiovascular: Normal s1, s2, RRR  Respiratory: CTA b/l   Abdominal: Soft, TTP LUQ/LLQ   Extremities: No swelling in LEs  Neurologic: Non focal  Psych: Awake, alert answering questions appropriately

## 2023-05-15 NOTE — ED ADULT NURSE NOTE - NSFALLHARMRISKINTERV_ED_ALL_ED

## 2023-05-15 NOTE — ED ADULT NURSE NOTE - PAIN RATING/NUMBER SCALE (0-10): ACTIVITY
STANDARD AUDIOMETRIC EVALUATION      Name:  Usman Salcido  :  2011  Age:  7 y.o.  Date of Evaluation:  2019      HISTORY    Reason for visit:  Usman Salcido was seen today for a hearing evaluation at the request of Dr. Bret Bustamante. Carmine was accompanied to today's appointment by his mother. She reported that his ears have been very sensitive for the last few years. She stated that Carmine has never had an ear infection. He reportedly passed his  hearing screening bilaterally. His family history is negative for childhood hearing loss. Carmine's mother believes his speech has developed well and he hit all milestone, but she said she will know when he starts school in the fall. She denied concerns regarding his hearing sensitivity. No other significant audiologic information or risk factors were reported.       EVALUATION    See Audiogram    RESULTS        Otoscopy and Tympanometry 226 Hz :  Right Ear:  Otoscopy:  Visible ear drum          Tympanometry:  Middle ear function within normal limits    Left Ear:   Otoscopy:  Visible ear drum        Tympanometry:  Middle ear function within normal limits    Test technique:  Standard Audiometry     Pure Tone Audiometry:   Patient responded to pure tones at 5-10 dB for 250-8000 Hz in right ear, and at 5-15 dB for 250-8000 Hz in left ear.       Speech Audiometry:        Right Ear:  Speech Reception Threshold (SRT) was obtained at 0 dBHL                 Speech Discrimination scores were 100% in quiet when words were presented at 40 dBHL       Left Ear:  Speech Reception Threshold (SRT) was obtained at 5 dBHL                 Speech Discrimination scores were 100% in quiet when words were presented at 45 dBHL    Reliability:   excellent    IMPRESSIONS:  1.  Tympanometry results are consistent with Middle ear function within normal limits in both ears.  2.  Pure tone results are consistent with hearing sensitivity within normal limits for both ears.        RECOMMENDATIONS:  Patient is seeing the Ear Nose and Throat physician immediately following this examination.  It was a pleasure seeing Usman Salcido in Audiology today.  We would be happy to do further testing or discuss these test as necessary.              This document has been electronically signed by DELORES Bates on August 5, 2019 9:53 AM   10 (severe pain)

## 2023-05-16 LAB
CULTURE RESULTS: SIGNIFICANT CHANGE UP
SPECIMEN SOURCE: SIGNIFICANT CHANGE UP

## 2023-06-05 NOTE — H&P CARDIOLOGY - VENOUS THROMBOEMBOLISM
In one month - make nurse visit for Men B #2/2 and HPV #2/3  5 months after #2 HPV - HPV #3    Appt with adult medicine at age 25  Can see me for illness until then  Good luck!!!     My recommendations for Adult Med doc:   Susi Smith MD - Internal Medicine-[place] [phone]  Ann Sheikh MD - Internal Medicine [phone]  Сергей Ronquillo MD - [place] [facility] [place] [phone]  Kev Ferreira MD - Family Medicine, [address] [employer], [place] [phone]  Faby Carlin DO - Family Medicine, [address] [facility] MD - Internal Medicine, [facility] [phone]  Po Mcdermott MD - female, Internal Medicine, [facility] [address] [phone]  Immunization History   Administered Date(s) Administered    >=3 YRS FLUZONE OR FLUARIX QUAD PRESERVE FREE SINGLE DOSE (93680) FLU CLINIC 10/20/2015    Covid-19 Vaccine Pfizer 30 mcg/0.3 ml 05/28/2021, 06/18/2021    DTAP 09/12/2005, 11/22/2005, 01/11/2006, 10/18/2006    DTAP-IPV 08/06/2009    HEP A 07/19/2007, 09/04/2008    HEP B 07/10/2005, 08/09/2005, 01/29/2007    HIB 09/12/2005, 11/22/2005, 01/11/2006, 10/18/2006    Hpv Virus Vaccine [facility] 06/05/2023    IPV 09/12/2005, 11/22/2005, 01/29/2007    Influenza 01/11/2006, 11/08/2007, 01/02/2008, 01/19/2011, 11/05/2013    MMR 07/14/2006    MMR/Varicella Combined 08/19/2010    Meningococcal B, Omv 06/05/2023    Meningococcal-Menactra 11/18/2016    Meningococcal-Menveo 06/27/2022    Pneumococcal Vaccine, Conjugate 09/12/2005, 11/22/2005, 01/11/2006, 10/18/2006    TDAP 10/20/2015    Varicella 07/14/2006
no

## 2023-06-28 ENCOUNTER — APPOINTMENT (OUTPATIENT)
Dept: DERMATOLOGY | Facility: CLINIC | Age: 68
End: 2023-06-28
Payer: COMMERCIAL

## 2023-06-28 DIAGNOSIS — B35.4 TINEA CORPORIS: ICD-10-CM

## 2023-06-28 PROCEDURE — 99214 OFFICE O/P EST MOD 30 MIN: CPT

## 2023-06-28 RX ORDER — TERBINAFINE HYDROCHLORIDE 250 MG/1
250 TABLET ORAL
Qty: 14 | Refills: 0 | Status: ACTIVE | COMMUNITY
Start: 2023-06-28 | End: 1900-01-01

## 2023-07-07 ENCOUNTER — APPOINTMENT (OUTPATIENT)
Dept: PULMONOLOGY | Facility: CLINIC | Age: 68
End: 2023-07-07
Payer: COMMERCIAL

## 2023-07-07 VITALS
HEIGHT: 68 IN | DIASTOLIC BLOOD PRESSURE: 79 MMHG | OXYGEN SATURATION: 97 % | SYSTOLIC BLOOD PRESSURE: 120 MMHG | HEART RATE: 58 BPM | WEIGHT: 190 LBS | BODY MASS INDEX: 28.79 KG/M2

## 2023-07-07 DIAGNOSIS — Z00.00 ENCOUNTER FOR GENERAL ADULT MEDICAL EXAMINATION W/OUT ABNORMAL FINDINGS: ICD-10-CM

## 2023-07-07 DIAGNOSIS — C91.40 HAIRY CELL LEUKEMIA NOT HAVING ACHIEVED REMISSION: ICD-10-CM

## 2023-07-07 PROCEDURE — 36415 COLL VENOUS BLD VENIPUNCTURE: CPT

## 2023-07-07 PROCEDURE — 99397 PER PM REEVAL EST PAT 65+ YR: CPT | Mod: 25

## 2023-07-07 NOTE — HISTORY OF PRESENT ILLNESS
[Never] : never [TextBox_4] : 67-year-old patient\par on going heme management leukemia \par as incidental inc Glucose as high as Glucose 187 \par no change RX  no change diet\par \par History as reviewed\par No significant history is cardiac disease on Plavix and statin therapy\par Chief complaint started on Saturday Sunday feeling fatigued and tired\par Subsequently developed a sore throat and cough\par Noted nasal congestion of significance\par No chest congestion or significant no wheezing\par No significant fevers\par Taste and smell intact\par No GI symptoms\par No skin rash\par Notes no headache\par Patient did a rapid COVID antigen at home this a.m. which was positive\par O2 saturations 9697%\par Heart rate in the 70s and regular\par \par Patient has received COVID-vaccine 2 dose protocol with 1 booster\par
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
## 2023-07-07 NOTE — PROCEDURE
[FreeTextEntry1] : \par Laboratory data reviewed\par Blood work completed with hematology oncology MD\par Noted glucose as of June 2021 202\par December 14, 2022 random glucose 150\par June 14, 2023 random glucose 187\par Otherwise serum electrolytes are normal\par No hemoglobin A1c\par Mild elevation of ALT 63 with normal AST\par Total bilirubin 2.0\par 3/1/2026 creatinine 0.8\par \par \par \par Fatty liver\par CBC\par WBC 3.24\par Hemoglobin 15.8 hematocrit 43.8\par \par \par Chest x-ray PA lateral Nov 30 2022\par Borderline cardiomegaly\par Lung fields are clear without parenchymal infiltrates pleural effusions with dominant pulmonary nodules\par Soft tissue bony structures unremarkable\par Home mediastinum unremarkable\par Data comparison to chest x-ray November 30, 2022 does not demonstrate any interval change\par Impression clear lungs no evidence for pneumonia\par \par PFT Nov 30 2022\par Flow  rates nl\par lung volumes nl\par DLCO 83 %\par HGB 14.8\par \par Chest x-ray PA lateral November 30, 2022\par Cardiac size grossly normal\par Post COVID-19 infection\par Lung fields are clear\par No parenchymal infiltrates pleural effusions dominant pulmonary nodules\par Impression\par No evidence for pulmonary parenchymal disease post COVID-19 infection\par \par Pulmonary 6-minute walk exercise study\par November 30, 2022\par Air study\par Baseline O2 saturation 95%\par Impression normal study\par No need air desaturation\par \par \par Chest x-ray PA lateral March 31, 2021\par Cardiac size normal\par Lung fields are clear\par No parenchymal infiltrates pleural effusions or dominant pulmonary nodules\par Soft tissue bony structures unremarkable\par Angie mediastinum unremarkable\par \par EKG 3/31/22\par  NSST Changes\par \par EKG July 20, 2020\par Sinus bradycardia\par RSR\par No acute changes\par \par Spirometry January 27, 2020\par Minimal reduction FVC\par Normal flow rates\par No response to bronchodilator at the FEV1\par Sawtooth pattern\par Data demonstrates compared to December 31 interval improvement at the FEV1 from 2.79 to 2.91 L with a relatively unchanged FVC\par \par Chest x-ray PA lateral January 27, 2020\par Normal cardiac size\par Grossly clear lung fields\par No parenchymal infiltrates pleural effusions or dominant pulmonary nodules\par No gross evidence for rib fracture based on the PA lateral view\par No gross interval change compared to chest x-ray of December 31, 2019\par Chest x-ray PA lateral August 21, 2019\par Borderline cardiomegaly\par Grossly clear lung fields without parenchymal infiltrates pleural effusions dominant pulmonary nodules\par Crowding of markings at the right lower lung zone which is unchanged dating back years and stable\par Impression no evidence of pneumonia\par \par PFT August 21, 2019\par Spirometry normal\par No response to bronchodilator at FEV1\par Lung volumes normal with total lung capacity 87% predicted.\par Diffusion normal 82% of predicted.\par There is no interval decline of flow rates compared to prior studies dating back from April 25, 2018 as well as May 20, 2019\par \par  Colonoscopy up to date\par \par Blood Draw\par \par

## 2023-07-08 ENCOUNTER — NON-APPOINTMENT (OUTPATIENT)
Age: 68
End: 2023-07-08

## 2023-07-08 LAB
ALBUMIN SERPL ELPH-MCNC: 4.4 G/DL
ALP BLD-CCNC: 96 U/L
ALT SERPL-CCNC: 49 U/L
ANION GAP SERPL CALC-SCNC: 13 MMOL/L
AST SERPL-CCNC: 23 U/L
BILIRUB DIRECT SERPL-MCNC: 0.4 MG/DL
BILIRUB INDIRECT SERPL-MCNC: 1.3 MG/DL
BILIRUB SERPL-MCNC: 1.7 MG/DL
BUN SERPL-MCNC: 25 MG/DL
CALCIUM SERPL-MCNC: 9.4 MG/DL
CHLORIDE SERPL-SCNC: 106 MMOL/L
CHOLEST SERPL-MCNC: 112 MG/DL
CO2 SERPL-SCNC: 23 MMOL/L
CREAT SERPL-MCNC: 0.88 MG/DL
EGFR: 95 ML/MIN/1.73M2
ESTIMATED AVERAGE GLUCOSE: 111 MG/DL
GLUCOSE SERPL-MCNC: 120 MG/DL
HBA1C MFR BLD HPLC: 5.5 %
HDLC SERPL-MCNC: 50 MG/DL
LDLC SERPL CALC-MCNC: 50 MG/DL
NONHDLC SERPL-MCNC: 62 MG/DL
POTASSIUM SERPL-SCNC: 4.2 MMOL/L
PROT SERPL-MCNC: 6.5 G/DL
PSA SERPL-MCNC: 1.11 NG/ML
SODIUM SERPL-SCNC: 142 MMOL/L
T4 FREE SERPL-MCNC: 1.2 NG/DL
T4 SERPL-MCNC: 7.1 UG/DL
TRIGL SERPL-MCNC: 63 MG/DL
TSH SERPL-ACNC: 1.4 UIU/ML

## 2023-08-09 ENCOUNTER — APPOINTMENT (OUTPATIENT)
Dept: CARDIOLOGY | Facility: CLINIC | Age: 68
End: 2023-08-09
Payer: COMMERCIAL

## 2023-08-09 VITALS
DIASTOLIC BLOOD PRESSURE: 77 MMHG | HEIGHT: 68 IN | BODY MASS INDEX: 29.25 KG/M2 | WEIGHT: 193 LBS | OXYGEN SATURATION: 95 % | HEART RATE: 63 BPM | SYSTOLIC BLOOD PRESSURE: 136 MMHG

## 2023-08-09 DIAGNOSIS — I25.10 ATHEROSCLEROTIC HEART DISEASE OF NATIVE CORONARY ARTERY W/OUT ANGINA PECTORIS: ICD-10-CM

## 2023-08-09 DIAGNOSIS — Z95.5 PRESENCE OF CORONARY ANGIOPLASTY IMPLANT AND GRAFT: ICD-10-CM

## 2023-08-09 DIAGNOSIS — E78.2 MIXED HYPERLIPIDEMIA: ICD-10-CM

## 2023-08-09 PROCEDURE — 93000 ELECTROCARDIOGRAM COMPLETE: CPT

## 2023-08-09 PROCEDURE — 99214 OFFICE O/P EST MOD 30 MIN: CPT

## 2023-08-09 RX ORDER — MECLIZINE HYDROCHLORIDE 12.5 MG/1
12.5 TABLET ORAL 3 TIMES DAILY
Qty: 30 | Refills: 3 | Status: DISCONTINUED | COMMUNITY
Start: 2021-01-21 | End: 2023-08-09

## 2023-08-09 NOTE — HISTORY OF PRESENT ILLNESS
[FreeTextEntry1] : Merrill is an overall healthy 68-year-old with a history of hypertension and remote stent many years ago.  He has a history of hyperlipidemia and a strong family history particularly in his father of coronary artery disease  I have seen him on a few occasions and he has been quite stable without any cardiac complaints  During COVID-19, he has been experiencing exertional fatigue and vague chest pressure and chest tenderness.  There is no associated nausea vomiting diaphoresis or shortness of breath.  He denies palpitations dizziness or syncope  He is on stable medications  Because of the chest pain he underwent an echocardiogram and subsequent stress test both of which were unremarkable.  His chest pain has pretty much resolved  Over the last several months he has been complaining of dizziness which is interfering with his lifestyle.  He has no palpitations but he feels his heart rate is slow during these periods of dizziness sometimes down to 45 beats a minute  He is on Toprol-XL 50 twice daily.  On last visit we decreased this because of the dizziness to Toprol-XL 50 just at night   visit November 15, 2022: The patient developed COVID-19 on November 1.  He has been ill with this but did receive intravenous infusion 2 days after he was found positive for COVID-19.  He has been complaining of cough chest pressure headaches.  He was sent to the emergency room because of the persistent cough and shortness of breath where he had a CT to rule out a pulmonary embolus which was negative.  However they did draw a troponin and his troponin initially was 15 and then went to 9.  They were concerned about the change though 15 is a relatively normal high-sensitivity troponin.  His EKG was unremarkable.  I spoke to them in the emergency room and told him to send him home and that we would arrange for follow-up as I did not feel he had an acute ischemic episode   presently still not feeling well with headache and a cough but is less short of breath still has some intermittent chest pressure usually after coughing  Since being on the low-dose of Toprol, he feels well.  He has almost no dizziness.  He has good exercise tolerance.  He recently saw his hematologist who is going to do further blood tests for follow-up of his hairy cell leukemia.  He continues to have good exercise tolerance and denies palpitations chest pain edema orthopnea PND Recent EKG by Dr. Palmer March 2022 within normal limits  EKG  again reveals normal sinus rhythm and is within normal limits  Visit August 9, 2023: Patient did have a exercise nuclear stress test in December 2022.  He had excellent exercise tolerance no EKG changes and normal myocardial perfusion.  The patient has been complaining that later in the day has had some swelling of his lower extremities.  He claims to be careful with the salt in his diet.  When he wakes in the morning the edema of his lower extremities is gone he has no significant shortness of breath or chest pain or palpitations.  He also has complained of some tingling in his left arm and down his leg with some vague discomfort in his shoulder area.  No chest pain EKG August 9, 2023 normal sinus rhythm within normal limits

## 2023-08-09 NOTE — DISCUSSION/SUMMARY
[FreeTextEntry1] : 1.  He will continue his present regimen of medication 2.  I reassured her there was no active cardiac issue to explain the swelling of his lower extremities and perhaps he had increased his salt intake 3.  I told to be more careful with salt intake, continue present medication.  Keep his legs elevated at night  Routine follow-up again in 4 to 6 months [EKG obtained to assist in diagnosis and management of assessed problem(s)] : EKG obtained to assist in diagnosis and management of assessed problem(s)

## 2023-08-09 NOTE — REASON FOR VISIT
[FreeTextEntry1] : Merrill Adrian Was recently in the [facility] emergency room with shortness of breath to rule out a pulmonary embolus.  He was found to have no evidence of pulmonary embolus but his troponin which was normal on second measured trended down and they wanted to admit him to the hospital.  I spoke to them and told to send him home.  He now comes for follow-up

## 2023-08-09 NOTE — ASSESSMENT
[FreeTextEntry1] : Merrill Adrian has a history of prior stent hypertension hyperlipidemia.  He had  been experiencing some vague chest discomfort with exertion as well as a tenderness in his chest wall.  His EKG is normal, his blood pressure is under good control.  Previous noninvasive cardiac evaluation several months ago with an echo and stress test were unremarkable.  Nuclear stress test December 2022 was normal with normal exercise tolerance and normal myocardial perfusion   he recently contracted COVID-19   With symptoms of headache fatigue cough and chest pressure..  Pulmonary embolus was ruled out.He did have some change in troponin though was relatively normal and he is very concerned about this though I tried to reassure him that those troponin levels were quite normal and his EKG was unremarkable  Recently has been complaining of some edema of his lower extremities but no shortness of breath.  1.  Status post PTCI remote no symptoms of angina or CHF previous noninvasive work-up unremarkable 2.  Recent dizziness has resolved on a decreased dose of Toprol because of sinus bradycardia. 3.  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  Recent COVID-19 with symptoms of cough chest pressure headache and fatigue.  It is slowly getting better but still bothersome to him 5.  Recent edema during the day of the lower extremities very nonspecific no evidence of a cardiac etiology.  He claims to be careful with salt but perhaps he has increased his salt intake

## 2023-08-11 ENCOUNTER — APPOINTMENT (OUTPATIENT)
Dept: PULMONOLOGY | Facility: CLINIC | Age: 68
End: 2023-08-11
Payer: COMMERCIAL

## 2023-08-11 VITALS — HEART RATE: 81 BPM | SYSTOLIC BLOOD PRESSURE: 129 MMHG | OXYGEN SATURATION: 96 % | DIASTOLIC BLOOD PRESSURE: 74 MMHG

## 2023-08-11 PROCEDURE — 94060 EVALUATION OF WHEEZING: CPT

## 2023-08-11 PROCEDURE — 71046 X-RAY EXAM CHEST 2 VIEWS: CPT

## 2023-08-11 PROCEDURE — 99214 OFFICE O/P EST MOD 30 MIN: CPT | Mod: 25

## 2023-08-11 RX ORDER — DOXYCYCLINE HYCLATE 100 MG/1
100 TABLET ORAL
Qty: 14 | Refills: 0 | Status: ACTIVE | COMMUNITY
Start: 2023-08-11 | End: 1900-01-01
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
## 2023-08-11 NOTE — PROCEDURE
[FreeTextEntry1] : NIOx  17 ppb WNL  8/11/23 Chest x-ray PA lateral 4011 2023 Lung fields are clear (Trace pleural effusions or dominant pulmonary nodules Soft tissue bony structures unremarkable Mediastinum unremarkable IMP  clear lungs  Spirometry forced 11/2023 Flow rates relatively normal with a mild decline at the FVC most noted is decline of spirometric data compared to November 30, 2022    Laboratory data reviewed Blood work completed with hematology oncology MD Noted glucose as of June 2021 202 December 14, 2022 random glucose 150 June 14, 2023 random glucose 187 Otherwise serum electrolytes are normal No hemoglobin A1c Mild elevation of ALT 63 with normal AST Total bilirubin 2.0 3/1/2026 creatinine 0.8    Fatty liver CBC WBC 3.24 Hemoglobin 15.8 hematocrit 43.8   Chest x-ray PA lateral Nov 30 2022 Borderline cardiomegaly Lung fields are clear without parenchymal infiltrates pleural effusions with dominant pulmonary nodules Soft tissue bony structures unremarkable Home mediastinum unremarkable Data comparison to chest x-ray November 30, 2022 does not demonstrate any interval change Impression clear lungs no evidence for pneumonia  PFT Nov 30 2022 Flow  rates nl lung volumes nl DLCO 83 % HGB 14.8  Chest x-ray PA lateral November 30, 2022 Cardiac size grossly normal Post COVID-19 infection Lung fields are clear No parenchymal infiltrates pleural effusions dominant pulmonary nodules Impression No evidence for pulmonary parenchymal disease post COVID-19 infection  Pulmonary 6-minute walk exercise study November 30, 2022 Air study Baseline O2 saturation 95% Impression normal study No need air desaturation   Chest x-ray PA lateral March 31, 2021 Cardiac size normal Lung fields are clear No parenchymal infiltrates pleural effusions or dominant pulmonary nodules Soft tissue bony structures unremarkable Angie mediastinum unremarkable  EKG 3/31/22  NSST Changes  EKG July 20, 2020 Sinus bradycardia RSR No acute changes  Spirometry January 27, 2020 Minimal reduction FVC Normal flow rates No response to bronchodilator at the FEV1 [place] pattern Data demonstrates compared to December 31 interval improvement at the FEV1 from 2.79 to 2.91 L with a relatively unchanged FVC  Chest x-ray PA lateral January 27, 2020 Normal cardiac size Grossly clear lung fields No parenchymal infiltrates pleural effusions or dominant pulmonary nodules No gross evidence for rib fracture based on the PA lateral view No gross interval change compared to chest x-ray of December 31, 2019 Chest x-ray PA lateral August 21, 2019 Borderline cardiomegaly Grossly clear lung fields without parenchymal infiltrates pleural effusions dominant pulmonary nodules Crowding of markings at the right lower lung zone which is unchanged dating back years and stable Impression no evidence of pneumonia  PFT August 21, 2019 Spirometry normal No response to bronchodilator at FEV1 Lung volumes normal with total lung capacity 87% predicted. Diffusion normal 82% of predicted. There is no interval decline of flow rates compared to prior studies dating back from April 25, 2018 as well as May 20, 2019   Colonoscopy up to date  Blood Draw

## 2023-08-11 NOTE — PHYSICAL EXAM
[No Acute Distress] : no acute distress [Normal Oropharynx] : normal oropharynx [Normal Appearance] : normal appearance [Supple] : supple [No Neck Mass] : no neck mass [No JVD] : no jvd [Normal Rate/Rhythm] : normal rate/rhythm [Normal S1, S2] : normal s1, s2 [No Resp Distress] : no resp distress [No Murmurs] : no murmurs [No Acc Muscle Use] : no acc muscle use [Normal Palpation] : normal palpation [No Abnormalities] : no abnormalities [Normal Rhythm and Effort] : normal rhythm and effort [Benign] : benign [Not Tender] : not tender [Soft] : soft [Normal Bowel Sounds] : normal bowel sounds [No HSM] : no hsm [Normal Gait] : normal gait [No Clubbing] : no clubbing [No Cyanosis] : no cyanosis [No Edema] : no edema [FROM] : FROM

## 2023-08-16 ENCOUNTER — APPOINTMENT (OUTPATIENT)
Dept: PULMONOLOGY | Facility: CLINIC | Age: 68
End: 2023-08-16
Payer: COMMERCIAL

## 2023-08-16 VITALS
TEMPERATURE: 97.7 F | SYSTOLIC BLOOD PRESSURE: 128 MMHG | DIASTOLIC BLOOD PRESSURE: 73 MMHG | HEART RATE: 79 BPM | OXYGEN SATURATION: 97 % | RESPIRATION RATE: 14 BRPM

## 2023-08-16 DIAGNOSIS — R06.2 WHEEZING: ICD-10-CM

## 2023-08-16 DIAGNOSIS — R07.89 OTHER CHEST PAIN: ICD-10-CM

## 2023-08-16 PROCEDURE — 95012 NITRIC OXIDE EXP GAS DETER: CPT

## 2023-08-16 PROCEDURE — 94727 GAS DIL/WSHOT DETER LNG VOL: CPT

## 2023-08-16 PROCEDURE — 94729 DIFFUSING CAPACITY: CPT

## 2023-08-16 PROCEDURE — 94010 BREATHING CAPACITY TEST: CPT

## 2023-08-16 PROCEDURE — ZZZZZ: CPT

## 2023-08-16 PROCEDURE — 99214 OFFICE O/P EST MOD 30 MIN: CPT | Mod: 25

## 2023-08-16 RX ORDER — DOXYCYCLINE HYCLATE 100 MG/1
100 TABLET ORAL
Qty: 14 | Refills: 0 | Status: DISCONTINUED | COMMUNITY
Start: 2023-01-26 | End: 2023-08-16

## 2023-08-16 RX ORDER — AZITHROMYCIN 250 MG/1
250 TABLET, FILM COATED ORAL
Qty: 1 | Refills: 0 | Status: DISCONTINUED | COMMUNITY
Start: 2022-11-09 | End: 2023-08-16

## 2023-08-16 RX ORDER — PROMETHAZINE HYDROCHLORIDE AND DEXTROMETHORPHAN HYDROBROMIDE ORAL SOLUTION 15; 6.25 MG/5ML; MG/5ML
6.25-15 SOLUTION ORAL
Qty: 300 | Refills: 2 | Status: ACTIVE | COMMUNITY
Start: 2023-08-16 | End: 1900-01-01

## 2023-08-16 NOTE — HISTORY OF PRESENT ILLNESS
[Never] : never [TextBox_4] : 67-year-old patient no resolving cough favor post  viral bronchitis completing tx DOXY plus Medrol bhupendra  and tessalon with little impact  no  fever chills  no sputum no heme  on going heme management leukemia  as incidental inc Glucose as high as Glucose 187  no change RX  no change diet  History as reviewed No significant history is cardiac disease on Plavix and statin therapy Chief complaint started on Saturday Sunday feeling fatigued and tired Subsequently developed a sore throat and cough Noted nasal congestion of significance No chest congestion or significant no wheezing No significant fevers Taste and smell intact No GI symptoms No skin rash Notes no headache Patient did a rapid COVID antigen at home this a.m. which was positive O2 saturations 9697% Heart rate in the 70s and regular  Patient has received COVID-vaccine 2 dose protocol with 1 booster
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
## 2023-08-16 NOTE — PROCEDURE
[FreeTextEntry1] : NIOX 11 ppb remains WNL  8/16/23  PFT f/u  8/11/23 flow rates nl lung volumes nl  DLCOI 75 % WNL  noted  slight decline at TLC and DLCO HGB 16.2  NIOx  17 ppb WNL  8/11/23 Chest x-ray PA lateral Aug 11 2023 Lung fields are clear Neg  pleural effusions or dominant pulmonary nodules Soft tissue bony structures unremarkable Mediastinum unremarkable IMP  clear lungs  Spirometry 8/11/23 Flow rates relatively normal with a mild decline at the FVC most noted is decline of spirometric data compared to November 30, 2022  Laboratory data reviewed Blood work completed with hematology oncology MD Noted glucose as of June 2021 202 December 14, 2022 random glucose 150 June 14, 2023 random glucose 187 Otherwise serum electrolytes are normal No hemoglobin A1c Mild elevation of ALT 63 with normal AST Total bilirubin 2.0 3/1/2026 creatinine 0.8    Fatty liver CBC WBC 3.24 Hemoglobin 15.8 hematocrit 43.8   Chest x-ray PA lateral Nov 30 2022 Borderline cardiomegaly Lung fields are clear without parenchymal infiltrates pleural effusions with dominant pulmonary nodules Soft tissue bony structures unremarkable Home mediastinum unremarkable Data comparison to chest x-ray November 30, 2022 does not demonstrate any interval change Impression clear lungs no evidence for pneumonia  PFT Nov 30 2022 Flow  rates nl lung volumes nl DLCO 83 % HGB 14.8  Chest x-ray PA lateral November 30, 2022 Cardiac size grossly normal Post COVID-19 infection Lung fields are clear No parenchymal infiltrates pleural effusions dominant pulmonary nodules Impression No evidence for pulmonary parenchymal disease post COVID-19 infection  Pulmonary 6-minute walk exercise study November 30, 2022 Air study Baseline O2 saturation 95% Impression normal study No need air desaturation   Chest x-ray PA lateral March 31, 2021 Cardiac size normal Lung fields are clear No parenchymal infiltrates pleural effusions or dominant pulmonary nodules Soft tissue bony structures unremarkable Angie mediastinum unremarkable  EKG 3/31/22  NSST Changes  EKG July 20, 2020 Sinus bradycardia RSR No acute changes  Spirometry January 27, 2020 Minimal reduction FVC Normal flow rates No response to bronchodilator at the FEV1 [place] pattern Data demonstrates compared to December 31 interval improvement at the FEV1 from 2.79 to 2.91 L with a relatively unchanged FVC  Chest x-ray PA lateral January 27, 2020 Normal cardiac size Grossly clear lung fields No parenchymal infiltrates pleural effusions or dominant pulmonary nodules No gross evidence for rib fracture based on the PA lateral view No gross interval change compared to chest x-ray of December 31, 2019 Chest x-ray PA lateral August 21, 2019 Borderline cardiomegaly Grossly clear lung fields without parenchymal infiltrates pleural effusions dominant pulmonary nodules Crowding of markings at the right lower lung zone which is unchanged dating back years and stable Impression no evidence of pneumonia  PFT August 21, 2019 Spirometry normal No response to bronchodilator at FEV1 Lung volumes normal with total lung capacity 87% predicted. Diffusion normal 82% of predicted. There is no interval decline of flow rates compared to prior studies dating back from April 25, 2018 as well as May 20, 2019   Colonoscopy up to date  Blood Draw

## 2023-08-18 ENCOUNTER — TRANSCRIPTION ENCOUNTER (OUTPATIENT)
Age: 68
End: 2023-08-18

## 2023-09-15 ENCOUNTER — LABORATORY RESULT (OUTPATIENT)
Age: 68
End: 2023-09-15

## 2023-09-15 ENCOUNTER — APPOINTMENT (OUTPATIENT)
Dept: PULMONOLOGY | Facility: CLINIC | Age: 68
End: 2023-09-15
Payer: COMMERCIAL

## 2023-09-15 VITALS
HEART RATE: 69 BPM | OXYGEN SATURATION: 99 % | SYSTOLIC BLOOD PRESSURE: 131 MMHG | DIASTOLIC BLOOD PRESSURE: 81 MMHG | RESPIRATION RATE: 16 BRPM

## 2023-09-15 DIAGNOSIS — J98.01 ACUTE BRONCHOSPASM: ICD-10-CM

## 2023-09-15 PROCEDURE — 36415 COLL VENOUS BLD VENIPUNCTURE: CPT

## 2023-09-15 PROCEDURE — 99214 OFFICE O/P EST MOD 30 MIN: CPT | Mod: 25

## 2023-09-15 PROCEDURE — 94060 EVALUATION OF WHEEZING: CPT

## 2023-09-15 RX ORDER — MONTELUKAST 10 MG/1
10 TABLET, FILM COATED ORAL
Qty: 1 | Refills: 3 | Status: ACTIVE | COMMUNITY
Start: 2023-09-15 | End: 1900-01-01

## 2023-09-15 RX ORDER — METHYLPREDNISOLONE 4 MG/1
4 TABLET ORAL
Qty: 1 | Refills: 0 | Status: DISCONTINUED | COMMUNITY
Start: 2023-08-11 | End: 2023-09-15

## 2023-09-15 RX ORDER — METHYLPREDNISOLONE 4 MG/1
4 TABLET ORAL
Qty: 1 | Refills: 0 | Status: DISCONTINUED | COMMUNITY
Start: 2022-11-09 | End: 2023-09-15

## 2023-09-16 ENCOUNTER — NON-APPOINTMENT (OUTPATIENT)
Age: 68
End: 2023-09-16

## 2023-09-17 ENCOUNTER — NON-APPOINTMENT (OUTPATIENT)
Age: 68
End: 2023-09-17

## 2023-09-17 LAB
BASOPHILS # BLD AUTO: 0.04 K/UL
BASOPHILS NFR BLD AUTO: 0.9 %
EOSINOPHIL # BLD AUTO: 0.25 K/UL
EOSINOPHIL NFR BLD AUTO: 6.1 %
HCT VFR BLD CALC: 47.4 %
HGB BLD-MCNC: 16.4 G/DL
LYMPHOCYTES # BLD AUTO: 1.65 K/UL
LYMPHOCYTES NFR BLD AUTO: 40.9 %
MAN DIFF?: NORMAL
MCHC RBC-ENTMCNC: 32.1 PG
MCHC RBC-ENTMCNC: 34.6 GM/DL
MCV RBC AUTO: 92.8 FL
MONOCYTES # BLD AUTO: 0.07 K/UL
MONOCYTES NFR BLD AUTO: 1.7 %
NEUTROPHILS # BLD AUTO: 1.79 K/UL
NEUTROPHILS NFR BLD AUTO: 44.3 %
PLATELET # BLD AUTO: 190 K/UL
RBC # BLD: 5.11 M/UL
RBC # FLD: 13.2 %
WBC # FLD AUTO: 4.04 K/UL

## 2023-09-18 ENCOUNTER — NON-APPOINTMENT (OUTPATIENT)
Age: 68
End: 2023-09-18

## 2023-09-18 LAB
A ALTERNATA IGE QN: <0.1 KUA/L
A FUMIGATUS IGE QN: 0.23 KUA/L
C ALBICANS IGE QN: <0.1 KUA/L
C HERBARUM IGE QN: <0.1 KUA/L
CAT DANDER IGE QN: 0.14 KUA/L
COMMON RAGWEED IGE QN: <0.1 KUA/L
D FARINAE IGE QN: <0.1 KUA/L
D PTERONYSS IGE QN: <0.1 KUA/L
DEPRECATED A ALTERNATA IGE RAST QL: 0
DEPRECATED A FUMIGATUS IGE RAST QL: NORMAL
DEPRECATED C ALBICANS IGE RAST QL: 0
DEPRECATED C HERBARUM IGE RAST QL: 0
DEPRECATED CAT DANDER IGE RAST QL: NORMAL
DEPRECATED COMMON RAGWEED IGE RAST QL: 0
DEPRECATED D FARINAE IGE RAST QL: 0
DEPRECATED D PTERONYSS IGE RAST QL: 0
DEPRECATED DOG DANDER IGE RAST QL: 0
DEPRECATED M RACEMOSUS IGE RAST QL: 0
DEPRECATED ROACH IGE RAST QL: 0
DEPRECATED TIMOTHY IGE RAST QL: 0
DEPRECATED WHITE OAK IGE RAST QL: 0
DOG DANDER IGE QN: <0.1 KUA/L
M RACEMOSUS IGE QN: <0.1 KUA/L
ROACH IGE QN: <0.1 KUA/L
TIMOTHY IGE QN: <0.1 KUA/L
WHITE OAK IGE QN: <0.1 KUA/L

## 2023-09-18 RX ORDER — FLUTICASONE PROPIONATE 110 UG/1
110 AEROSOL, METERED RESPIRATORY (INHALATION)
Qty: 1 | Refills: 3 | Status: DISCONTINUED | COMMUNITY
Start: 2023-09-15 | End: 2023-09-18

## 2023-09-18 RX ORDER — UMECLIDINIUM BROMIDE AND VILANTEROL TRIFENATATE 62.5; 25 UG/1; UG/1
62.5-25 POWDER RESPIRATORY (INHALATION)
Qty: 1 | Refills: 3 | Status: DISCONTINUED | COMMUNITY
Start: 2023-08-18 | End: 2023-09-18

## 2023-09-19 LAB
CLAM IGE QN: <0.1 KUA/L
CODFISH IGE QN: <0.1 KUA/L
CORN IGE QN: <0.1 KUA/L
COW MILK IGE QN: <0.1 KUA/L
DEPRECATED CLAM IGE RAST QL: 0
DEPRECATED CODFISH IGE RAST QL: 0
DEPRECATED CORN IGE RAST QL: 0
DEPRECATED COW MILK IGE RAST QL: 0
DEPRECATED EGG WHITE IGE RAST QL: 0
DEPRECATED PEANUT IGE RAST QL: 0
DEPRECATED SCALLOP IGE RAST QL: <0.1 KUA/L
DEPRECATED SESAME SEED IGE RAST QL: 0
DEPRECATED SHRIMP IGE RAST QL: NORMAL
DEPRECATED SOYBEAN IGE RAST QL: 0
DEPRECATED WALNUT IGE RAST QL: 0
DEPRECATED WHEAT IGE RAST QL: 0
EGG WHITE IGE QN: <0.1 KUA/L
PEANUT IGE QN: <0.1 KUA/L
SCALLOP IGE QN: 0
SCALLOP IGE QN: 0.14 KUA/L
SESAME SEED IGE QN: <0.1 KUA/L
SOYBEAN IGE QN: <0.1 KUA/L
WALNUT IGE QN: <0.1 KUA/L
WHEAT IGE QN: <0.1 KUA/L

## 2023-09-20 ENCOUNTER — APPOINTMENT (OUTPATIENT)
Dept: DERMATOLOGY | Facility: CLINIC | Age: 68
End: 2023-09-20

## 2023-09-21 ENCOUNTER — RX RENEWAL (OUTPATIENT)
Age: 68
End: 2023-09-21

## 2023-09-21 RX ORDER — CLOPIDOGREL BISULFATE 75 MG/1
75 TABLET, FILM COATED ORAL
Qty: 90 | Refills: 3 | Status: ACTIVE | COMMUNITY
Start: 2018-07-19 | End: 1900-01-01

## 2023-09-29 ENCOUNTER — APPOINTMENT (OUTPATIENT)
Dept: PULMONOLOGY | Facility: CLINIC | Age: 68
End: 2023-09-29
Payer: COMMERCIAL

## 2023-09-29 VITALS — DIASTOLIC BLOOD PRESSURE: 80 MMHG | SYSTOLIC BLOOD PRESSURE: 133 MMHG | HEART RATE: 61 BPM | OXYGEN SATURATION: 94 %

## 2023-09-29 DIAGNOSIS — Z23 ENCOUNTER FOR IMMUNIZATION: ICD-10-CM

## 2023-09-29 DIAGNOSIS — I10 ESSENTIAL (PRIMARY) HYPERTENSION: ICD-10-CM

## 2023-09-29 PROCEDURE — 99213 OFFICE O/P EST LOW 20 MIN: CPT | Mod: 25

## 2023-09-29 PROCEDURE — 90662 IIV NO PRSV INCREASED AG IM: CPT

## 2023-09-29 PROCEDURE — G0008: CPT

## 2023-09-29 RX ORDER — MOMETASONE FUROATE 200 UG/1
200 AEROSOL RESPIRATORY (INHALATION)
Qty: 1 | Refills: 3 | Status: DISCONTINUED | COMMUNITY
Start: 2023-09-18 | End: 2023-09-29

## 2023-09-29 RX ORDER — FLUTICASONE PROPIONATE 110 UG/1
110 AEROSOL, METERED RESPIRATORY (INHALATION)
Qty: 1 | Refills: 3 | Status: ACTIVE | COMMUNITY
Start: 2023-09-29 | End: 1900-01-01

## 2023-10-02 ENCOUNTER — TRANSCRIPTION ENCOUNTER (OUTPATIENT)
Age: 68
End: 2023-10-02

## 2023-10-09 NOTE — ED ADULT TRIAGE NOTE - DOMESTIC TRAVEL HIGH RISK QUESTION
----- Message from Nichole Weber sent at 10/9/2023  8:41 AM CDT -----  Regarding: pt call back  Name of Who is Calling:JOSE RAGLAND [[id number]]          What is the request in detail: pt states she cant hold any food down please advise           Can the clinic reply by [facility]: no           What Number to Call Back if not in [facility]: [phone] (home)        No

## 2023-11-06 ENCOUNTER — APPOINTMENT (OUTPATIENT)
Dept: PULMONOLOGY | Facility: CLINIC | Age: 68
End: 2023-11-06

## 2023-12-18 ENCOUNTER — TRANSCRIPTION ENCOUNTER (OUTPATIENT)
Age: 68
End: 2023-12-18

## 2023-12-18 RX ORDER — ECONAZOLE NITRATE 10 MG/G
1 CREAM TOPICAL TWICE DAILY
Qty: 2 | Refills: 4 | Status: ACTIVE | COMMUNITY
Start: 2023-06-28 | End: 1900-01-01

## 2024-02-21 RX ORDER — ATORVASTATIN CALCIUM 20 MG/1
20 TABLET, FILM COATED ORAL
Qty: 90 | Refills: 2 | Status: ACTIVE | COMMUNITY
Start: 2018-07-19 | End: 1900-01-01

## 2024-02-21 RX ORDER — METOPROLOL SUCCINATE 50 MG/1
50 TABLET, EXTENDED RELEASE ORAL
Qty: 90 | Refills: 3 | Status: ACTIVE | COMMUNITY
Start: 2022-06-14 | End: 1900-01-01

## 2024-03-15 ENCOUNTER — APPOINTMENT (OUTPATIENT)
Dept: PULMONOLOGY | Facility: CLINIC | Age: 69
End: 2024-03-15
Payer: COMMERCIAL

## 2024-03-15 VITALS
TEMPERATURE: 98.4 F | OXYGEN SATURATION: 94 % | HEART RATE: 56 BPM | SYSTOLIC BLOOD PRESSURE: 117 MMHG | DIASTOLIC BLOOD PRESSURE: 71 MMHG

## 2024-03-15 DIAGNOSIS — R05.3 CHRONIC COUGH: ICD-10-CM

## 2024-03-15 DIAGNOSIS — R68.89 OTHER GENERAL SYMPTOMS AND SIGNS: ICD-10-CM

## 2024-03-15 LAB
FLUAV SPEC QL CULT: NEGATIVE
FLUBV AG SPEC QL IA: NEGATIVE
S PYO AG SPEC QL IA: NEGATIVE
SARS-COV-2 AG RESP QL IA.RAPID: NEGATIVE

## 2024-03-15 PROCEDURE — 99214 OFFICE O/P EST MOD 30 MIN: CPT

## 2024-03-15 PROCEDURE — 87811 SARS-COV-2 COVID19 W/OPTIC: CPT | Mod: QW

## 2024-03-15 PROCEDURE — 71046 X-RAY EXAM CHEST 2 VIEWS: CPT

## 2024-03-15 PROCEDURE — 87804 INFLUENZA ASSAY W/OPTIC: CPT | Mod: QW

## 2024-03-15 PROCEDURE — 87880 STREP A ASSAY W/OPTIC: CPT | Mod: QW

## 2024-03-15 RX ORDER — AZITHROMYCIN 250 MG/1
250 TABLET, FILM COATED ORAL
Qty: 1 | Refills: 0 | Status: ACTIVE | COMMUNITY
Start: 2024-03-15 | End: 1900-01-01

## 2024-03-15 RX ORDER — FLUTICASONE PROPIONATE 50 UG/1
50 SPRAY, METERED NASAL
Qty: 1 | Refills: 3 | Status: ACTIVE | COMMUNITY
Start: 2024-03-15 | End: 1900-01-01

## 2024-03-15 NOTE — REVIEW OF SYSTEMS
[Fatigue] : fatigue [Sore Throat] : sore throat [Myalgias] : myalgias [Negative] : Endocrine [Fever] : no fever [Chills] : no chills [Chest Discomfort] : no chest discomfort [Cough] : no cough [TextBox_44] : ZHAO on Plavix

## 2024-03-15 NOTE — PHYSICAL EXAM
[No Acute Distress] : no acute distress [Normal Oropharynx] : normal oropharynx [Normal Appearance] : normal appearance [Supple] : supple [No JVD] : no jvd [No Neck Mass] : no neck mass [Normal S1, S2] : normal s1, s2 [Normal Rate/Rhythm] : normal rate/rhythm [No Murmurs] : no murmurs [No Resp Distress] : no resp distress [No Acc Muscle Use] : no acc muscle use [Normal Palpation] : normal palpation [Normal Rhythm and Effort] : normal rhythm and effort [No Abnormalities] : no abnormalities [Benign] : benign [Not Tender] : not tender [Soft] : soft [No HSM] : no hsm [Normal Bowel Sounds] : normal bowel sounds [Normal Gait] : normal gait [No Cyanosis] : no cyanosis [No Clubbing] : no clubbing [No Edema] : no edema [FROM] : FROM
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
## 2024-03-15 NOTE — HISTORY OF PRESENT ILLNESS
[Never] : never [TextBox_4] : 67-year-old patient Patient with flulike symptoms Headache nasal congestion sinus congestion cough No fever chills or sweats Just overall not feeling well Rapid home COVID negative No chest pain or increased shortness of breath  on going heme management leukemia  as incidental inc Glucose as high as Glucose 187  no change RX  no change diet  History as reviewed No significant history is cardiac disease on Plavix and statin therapy Chief complaint started on Saturday Sunday feeling fatigued and tired Subsequently developed a sore throat and cough Noted nasal congestion of significance No chest congestion or significant no wheezing No significant fevers Taste and smell intact No GI symptoms No skin rash Notes no headache Patient did a rapid COVID antigen at home this a.m. which was positive O2 saturations 9697% Heart rate in the 70s and regular  Patient has received COVID-vaccine 2 dose protocol with 1 booster

## 2024-03-15 NOTE — PROCEDURE
[FreeTextEntry1] : Chest x-ray PA lateral March 15, 2024 Cardiac size is normal Lung fields are clear No parenchymal infiltrate pleural effusions or dominant pulmonary nodules No evidence for pneumonia   RAD 9/15/21  Normal flows  NIOX 11 ppb remains WNL  8/16/23  PFT f/u  8/11/23 flow rates nl lung volumes nl  DLCOI 75 % WNL  noted  slight decline at TLC and DLCO HGB 16.2  NIOx  17 ppb WNL  8/11/23 Chest x-ray PA lateral Aug 11 2023 Lung fields are clear Neg  pleural effusions or dominant pulmonary nodules Soft tissue bony structures unremarkable Mediastinum unremarkable IMP  clear lungs  Spirometry 8/11/23 Flow rates relatively normal with a mild decline at the FVC most noted is decline of spirometric data compared to November 30, 2022  Laboratory data reviewed Blood work completed with hematology oncology MD Noted glucose as of June 2021 202 December 14, 2022 random glucose 150 June 14, 2023 random glucose 187 Otherwise serum electrolytes are normal No hemoglobin A1c Mild elevation of ALT 63 with normal AST Total bilirubin 2.0 3/1/2026 creatinine 0.8   Chest x-ray PA lateral Nov 30 2022 Borderline cardiomegaly Lung fields are clear without parenchymal infiltrates pleural effusions with dominant pulmonary nodules Soft tissue bony structures unremarkable Home mediastinum unremarkable Data comparison to chest x-ray November 30, 2022 does not demonstrate any interval change Impression clear lungs no evidence for pneumonia  PFT Nov 30 2022 Flow  rates nl lung volumes nl DLCO 83 % HGB 14.8  Chest x-ray PA lateral November 30, 2022 Cardiac size grossly normal Post COVID-19 infection Lung fields are clear No parenchymal infiltrates pleural effusions dominant pulmonary nodules Impression No evidence for pulmonary parenchymal disease post COVID-19 infection  Pulmonary 6-minute walk exercise study November 30, 2022 Air study Baseline O2 saturation 95% Impression normal study No need air desaturation   Chest x-ray PA lateral March 31, 2021 Cardiac size normal Lung fields are clear No parenchymal infiltrates pleural effusions or dominant pulmonary nodules Soft tissue bony structures unremarkable Angie mediastinum unremarkable  EKG 3/31/22  NSST Changes  EKG July 20, 2020 Sinus bradycardia RSR No acute changes  Spirometry January 27, 2020 Minimal reduction FVC Normal flow rates No response to bronchodilator at the FEV1 [place]oth pattern Data demonstrates compared to December 31 interval improvement at the FEV1 from 2.79 to 2.91 L with a relatively unchanged FVC  Chest x-ray PA lateral January 27, 2020 Normal cardiac size Grossly clear lung fields No parenchymal infiltrates pleural effusions or dominant pulmonary nodules No gross evidence for rib fracture based on the PA lateral view No gross interval change compared to chest x-ray of December 31, 2019 Chest x-ray PA lateral August 21, 2019 Borderline cardiomegaly Grossly clear lung fields without parenchymal infiltrates pleural effusions dominant pulmonary nodules Crowding of markings at the right lower lung zone which is unchanged dating back years and stable Impression no evidence of pneumonia  PFT August 21, 2019 Spirometry normal No response to bronchodilator at FEV1 Lung volumes normal with total lung capacity 87% predicted. Diffusion normal 82% of predicted. There is no interval decline of flow rates compared to prior studies dating back from April 25, 2018 as well as May 20, 2019   Colonoscopy up to date

## 2024-03-16 ENCOUNTER — NON-APPOINTMENT (OUTPATIENT)
Age: 69
End: 2024-03-16

## 2024-03-16 LAB
ANION GAP SERPL CALC-SCNC: 10 MMOL/L
BASOPHILS # BLD AUTO: 0.02 K/UL
BASOPHILS NFR BLD AUTO: 0.5 %
BUN SERPL-MCNC: 14 MG/DL
CALCIUM SERPL-MCNC: 9.8 MG/DL
CHLORIDE SERPL-SCNC: 104 MMOL/L
CO2 SERPL-SCNC: 29 MMOL/L
CREAT SERPL-MCNC: 0.96 MG/DL
EGFR: 87 ML/MIN/1.73M2
EOSINOPHIL # BLD AUTO: 0.07 K/UL
EOSINOPHIL NFR BLD AUTO: 1.8 %
GLUCOSE SERPL-MCNC: 98 MG/DL
HCT VFR BLD CALC: 45.4 %
HGB BLD-MCNC: 16 G/DL
IMM GRANULOCYTES NFR BLD AUTO: 0.3 %
INFLUENZA A RESULT: NOT DETECTED
INFLUENZA B RESULT: NOT DETECTED
LYMPHOCYTES # BLD AUTO: 1.52 K/UL
LYMPHOCYTES NFR BLD AUTO: 39.8 %
MAN DIFF?: NORMAL
MCHC RBC-ENTMCNC: 33.3 PG
MCHC RBC-ENTMCNC: 35.2 GM/DL
MCV RBC AUTO: 94.4 FL
MONOCYTES # BLD AUTO: 0.08 K/UL
MONOCYTES NFR BLD AUTO: 2.1 %
NEUTROPHILS # BLD AUTO: 2.12 K/UL
NEUTROPHILS NFR BLD AUTO: 55.5 %
PLATELET # BLD AUTO: 165 K/UL
POTASSIUM SERPL-SCNC: 5 MMOL/L
PSA SERPL-MCNC: 1.04 NG/ML
RBC # BLD: 4.81 M/UL
RBC # FLD: 13.5 %
RESP SYN VIRUS RESULT: NOT DETECTED
SARS-COV-2 RESULT: NOT DETECTED
SODIUM SERPL-SCNC: 143 MMOL/L
WBC # FLD AUTO: 3.82 K/UL

## 2024-03-17 LAB
ESTIMATED AVERAGE GLUCOSE: 111 MG/DL
HBA1C MFR BLD HPLC: 5.5 %

## 2024-03-18 ENCOUNTER — TRANSCRIPTION ENCOUNTER (OUTPATIENT)
Age: 69
End: 2024-03-18

## 2024-10-28 ENCOUNTER — RX RENEWAL (OUTPATIENT)
Age: 69
End: 2024-10-28

## 2024-11-19 ENCOUNTER — RX RENEWAL (OUTPATIENT)
Age: 69
End: 2024-11-19

## 2024-11-25 ENCOUNTER — APPOINTMENT (OUTPATIENT)
Dept: PULMONOLOGY | Facility: CLINIC | Age: 69
End: 2024-11-25
Payer: COMMERCIAL

## 2024-11-25 VITALS
HEART RATE: 59 BPM | SYSTOLIC BLOOD PRESSURE: 123 MMHG | OXYGEN SATURATION: 100 % | DIASTOLIC BLOOD PRESSURE: 74 MMHG | RESPIRATION RATE: 16 BRPM

## 2024-11-25 DIAGNOSIS — R68.89 OTHER GENERAL SYMPTOMS AND SIGNS: ICD-10-CM

## 2024-11-25 DIAGNOSIS — J98.01 ACUTE BRONCHOSPASM: ICD-10-CM

## 2024-11-25 PROCEDURE — 87804 INFLUENZA ASSAY W/OPTIC: CPT | Mod: QW

## 2024-11-25 PROCEDURE — G2211 COMPLEX E/M VISIT ADD ON: CPT | Mod: NC

## 2024-11-25 PROCEDURE — 71046 X-RAY EXAM CHEST 2 VIEWS: CPT

## 2024-11-25 PROCEDURE — 99214 OFFICE O/P EST MOD 30 MIN: CPT

## 2024-11-25 PROCEDURE — 87880 STREP A ASSAY W/OPTIC: CPT | Mod: QW

## 2024-11-25 PROCEDURE — 87811 SARS-COV-2 COVID19 W/OPTIC: CPT | Mod: QW

## 2024-11-25 RX ORDER — FLUTICASONE PROPIONATE 50 UG/1
50 SPRAY, METERED NASAL
Qty: 1 | Refills: 3 | Status: ACTIVE | COMMUNITY
Start: 2024-11-25 | End: 1900-01-01

## 2024-11-25 RX ORDER — AZITHROMYCIN 250 MG/1
250 TABLET, FILM COATED ORAL
Qty: 1 | Refills: 0 | Status: ACTIVE | COMMUNITY
Start: 2024-11-25 | End: 1900-01-01

## 2024-11-26 ENCOUNTER — NON-APPOINTMENT (OUTPATIENT)
Age: 69
End: 2024-11-26

## 2024-11-26 LAB
INFLUENZA A RESULT: NOT DETECTED
INFLUENZA B RESULT: NOT DETECTED
RESP SYN VIRUS RESULT: NOT DETECTED
SARS-COV-2 RESULT: NOT DETECTED

## 2024-12-10 ENCOUNTER — NON-APPOINTMENT (OUTPATIENT)
Age: 69
End: 2024-12-10

## 2024-12-10 ENCOUNTER — APPOINTMENT (OUTPATIENT)
Dept: CARDIOLOGY | Facility: CLINIC | Age: 69
End: 2024-12-10
Payer: COMMERCIAL

## 2024-12-10 VITALS
HEART RATE: 58 BPM | HEIGHT: 68 IN | DIASTOLIC BLOOD PRESSURE: 78 MMHG | BODY MASS INDEX: 27.43 KG/M2 | WEIGHT: 181 LBS | SYSTOLIC BLOOD PRESSURE: 120 MMHG

## 2024-12-10 DIAGNOSIS — I10 ESSENTIAL (PRIMARY) HYPERTENSION: ICD-10-CM

## 2024-12-10 DIAGNOSIS — I25.10 ATHEROSCLEROTIC HEART DISEASE OF NATIVE CORONARY ARTERY W/OUT ANGINA PECTORIS: ICD-10-CM

## 2024-12-10 DIAGNOSIS — R05.3 CHRONIC COUGH: ICD-10-CM

## 2024-12-10 DIAGNOSIS — Z95.5 PRESENCE OF CORONARY ANGIOPLASTY IMPLANT AND GRAFT: ICD-10-CM

## 2024-12-10 DIAGNOSIS — R00.1 BRADYCARDIA, UNSPECIFIED: ICD-10-CM

## 2024-12-10 DIAGNOSIS — M19.019 PRIMARY OSTEOARTHRITIS, UNSPECIFIED SHOULDER: ICD-10-CM

## 2024-12-10 DIAGNOSIS — R07.89 OTHER CHEST PAIN: ICD-10-CM

## 2024-12-10 PROCEDURE — 99204 OFFICE O/P NEW MOD 45 MIN: CPT

## 2024-12-10 PROCEDURE — G2211 COMPLEX E/M VISIT ADD ON: CPT | Mod: NC

## 2024-12-10 PROCEDURE — 93000 ELECTROCARDIOGRAM COMPLETE: CPT

## 2025-01-22 ENCOUNTER — APPOINTMENT (OUTPATIENT)
Dept: ORTHOPEDIC SURGERY | Facility: CLINIC | Age: 70
End: 2025-01-22
Payer: COMMERCIAL

## 2025-01-22 ENCOUNTER — APPOINTMENT (OUTPATIENT)
Dept: ORTHOPEDIC SURGERY | Facility: CLINIC | Age: 70
End: 2025-01-22

## 2025-01-22 VITALS — WEIGHT: 185 LBS | HEIGHT: 67.5 IN | BODY MASS INDEX: 28.7 KG/M2

## 2025-01-22 DIAGNOSIS — S70.01XA CONTUSION OF RIGHT HIP, INITIAL ENCOUNTER: ICD-10-CM

## 2025-01-22 DIAGNOSIS — M70.71 OTHER BURSITIS OF HIP, RIGHT HIP: ICD-10-CM

## 2025-01-22 PROCEDURE — 99203 OFFICE O/P NEW LOW 30 MIN: CPT

## 2025-01-22 PROCEDURE — 73502 X-RAY EXAM HIP UNI 2-3 VIEWS: CPT

## 2025-01-22 PROCEDURE — 72170 X-RAY EXAM OF PELVIS: CPT | Mod: 59

## 2025-01-22 RX ORDER — DIAZEPAM 5 MG/1
5 TABLET ORAL
Qty: 2 | Refills: 0 | Status: ACTIVE | COMMUNITY
Start: 2025-01-22 | End: 1900-01-01

## 2025-01-23 ENCOUNTER — APPOINTMENT (OUTPATIENT)
Dept: MRI IMAGING | Facility: CLINIC | Age: 70
End: 2025-01-23
Payer: COMMERCIAL

## 2025-01-23 PROCEDURE — 73721 MRI JNT OF LWR EXTRE W/O DYE: CPT | Mod: RT

## 2025-01-27 ENCOUNTER — APPOINTMENT (OUTPATIENT)
Dept: ORTHOPEDIC SURGERY | Facility: CLINIC | Age: 70
End: 2025-01-27

## 2025-01-28 ENCOUNTER — APPOINTMENT (OUTPATIENT)
Dept: MRI IMAGING | Facility: CLINIC | Age: 70
End: 2025-01-28

## 2025-02-25 ENCOUNTER — APPOINTMENT (OUTPATIENT)
Dept: PULMONOLOGY | Facility: CLINIC | Age: 70
End: 2025-02-25

## 2025-03-25 ENCOUNTER — TRANSCRIPTION ENCOUNTER (OUTPATIENT)
Age: 70
End: 2025-03-25

## 2025-08-12 ENCOUNTER — APPOINTMENT (OUTPATIENT)
Dept: CARDIOLOGY | Facility: CLINIC | Age: 70
End: 2025-08-12
Payer: COMMERCIAL

## 2025-08-12 VITALS
DIASTOLIC BLOOD PRESSURE: 80 MMHG | HEIGHT: 67 IN | SYSTOLIC BLOOD PRESSURE: 140 MMHG | WEIGHT: 191 LBS | OXYGEN SATURATION: 96 % | HEART RATE: 58 BPM | BODY MASS INDEX: 29.98 KG/M2

## 2025-08-12 DIAGNOSIS — R06.02 SHORTNESS OF BREATH: ICD-10-CM

## 2025-08-12 DIAGNOSIS — R07.89 OTHER CHEST PAIN: ICD-10-CM

## 2025-08-12 DIAGNOSIS — C91.42: ICD-10-CM

## 2025-08-12 DIAGNOSIS — Z79.01 LONG TERM (CURRENT) USE OF ANTICOAGULANTS: ICD-10-CM

## 2025-08-12 DIAGNOSIS — I25.10 ATHEROSCLEROTIC HEART DISEASE OF NATIVE CORONARY ARTERY W/OUT ANGINA PECTORIS: ICD-10-CM

## 2025-08-12 DIAGNOSIS — Z95.5 PRESENCE OF CORONARY ANGIOPLASTY IMPLANT AND GRAFT: ICD-10-CM

## 2025-08-12 DIAGNOSIS — I10 ESSENTIAL (PRIMARY) HYPERTENSION: ICD-10-CM

## 2025-08-12 DIAGNOSIS — E78.2 MIXED HYPERLIPIDEMIA: ICD-10-CM

## 2025-08-12 PROCEDURE — 99204 OFFICE O/P NEW MOD 45 MIN: CPT

## 2025-08-12 PROCEDURE — 93000 ELECTROCARDIOGRAM COMPLETE: CPT

## 2025-08-12 PROCEDURE — G2211 COMPLEX E/M VISIT ADD ON: CPT | Mod: NC

## 2025-08-25 ENCOUNTER — APPOINTMENT (OUTPATIENT)
Dept: CARDIOLOGY | Facility: CLINIC | Age: 70
End: 2025-08-25

## 2025-08-29 ENCOUNTER — RX RENEWAL (OUTPATIENT)
Age: 70
End: 2025-08-29